# Patient Record
Sex: MALE | Race: WHITE | URBAN - METROPOLITAN AREA
[De-identification: names, ages, dates, MRNs, and addresses within clinical notes are randomized per-mention and may not be internally consistent; named-entity substitution may affect disease eponyms.]

---

## 2019-04-03 ENCOUNTER — OUTPATIENT (OUTPATIENT)
Dept: OUTPATIENT SERVICES | Facility: HOSPITAL | Age: 64
LOS: 1 days | Discharge: HOME | End: 2019-04-03
Payer: COMMERCIAL

## 2019-04-03 DIAGNOSIS — E06.9 THYROIDITIS, UNSPECIFIED: ICD-10-CM

## 2019-04-04 PROCEDURE — 78020 THYROID MET UPTAKE: CPT | Mod: 26

## 2019-04-04 PROCEDURE — 78018 THYROID MET IMAGING BODY: CPT | Mod: 26

## 2019-06-10 ENCOUNTER — OUTPATIENT (OUTPATIENT)
Dept: OUTPATIENT SERVICES | Facility: HOSPITAL | Age: 64
LOS: 1 days | Discharge: HOME | End: 2019-06-10
Payer: COMMERCIAL

## 2019-06-10 DIAGNOSIS — C73 MALIGNANT NEOPLASM OF THYROID GLAND: ICD-10-CM

## 2019-06-10 DIAGNOSIS — D09.3 CARCINOMA IN SITU OF THYROID AND OTHER ENDOCRINE GLANDS: ICD-10-CM

## 2019-06-10 LAB — GLUCOSE BLDC GLUCOMTR-MCNC: 102 MG/DL — HIGH (ref 70–99)

## 2019-06-10 PROCEDURE — 78815 PET IMAGE W/CT SKULL-THIGH: CPT | Mod: 26,PI

## 2021-04-20 PROBLEM — Z00.00 ENCOUNTER FOR PREVENTIVE HEALTH EXAMINATION: Status: ACTIVE | Noted: 2021-04-20

## 2021-07-15 ENCOUNTER — NON-APPOINTMENT (OUTPATIENT)
Age: 66
End: 2021-07-15

## 2021-07-15 ENCOUNTER — TRANSCRIPTION ENCOUNTER (OUTPATIENT)
Age: 66
End: 2021-07-15

## 2021-07-15 ENCOUNTER — APPOINTMENT (OUTPATIENT)
Dept: PULMONOLOGY | Facility: CLINIC | Age: 66
End: 2021-07-15
Payer: MEDICARE

## 2021-07-15 VITALS
DIASTOLIC BLOOD PRESSURE: 78 MMHG | RESPIRATION RATE: 12 BRPM | SYSTOLIC BLOOD PRESSURE: 138 MMHG | HEART RATE: 68 BPM | OXYGEN SATURATION: 94 %

## 2021-07-15 PROCEDURE — 99213 OFFICE O/P EST LOW 20 MIN: CPT

## 2021-07-15 NOTE — PHYSICAL EXAM
[No Acute Distress] : no acute distress [Normal Appearance] : normal appearance [No Neck Mass] : no neck mass [Normal Rate/Rhythm] : normal rate/rhythm [Normal S1, S2] : normal s1, s2 [No Murmurs] : no murmurs [No Resp Distress] : no resp distress [Clear to Auscultation Bilaterally] : clear to auscultation bilaterally [No Abnormalities] : no abnormalities [No Clubbing] : no clubbing [No Cyanosis] : no cyanosis [No Edema] : no edema

## 2021-07-15 NOTE — DISCUSSION/SUMMARY
[FreeTextEntry1] : compliant with cppap and deriving benefit,asthma-controlled///////endocrine f/u//////////needs wt reduction

## 2021-10-19 ENCOUNTER — APPOINTMENT (OUTPATIENT)
Age: 66
End: 2021-10-19
Payer: MEDICARE

## 2021-10-19 VITALS
HEART RATE: 92 BPM | RESPIRATION RATE: 12 BRPM | DIASTOLIC BLOOD PRESSURE: 80 MMHG | SYSTOLIC BLOOD PRESSURE: 134 MMHG | OXYGEN SATURATION: 96 %

## 2021-10-19 DIAGNOSIS — Z85.850 PERSONAL HISTORY OF MALIGNANT NEOPLASM OF THYROID: ICD-10-CM

## 2021-10-19 PROCEDURE — 99213 OFFICE O/P EST LOW 20 MIN: CPT

## 2021-10-19 NOTE — DISCUSSION/SUMMARY
[FreeTextEntry1] : compliant with cppap and deriving benefit,asthma-controlled///////endocrine f/u//////////needs wt reduction//////order f/u ct chest-see report

## 2021-12-15 ENCOUNTER — APPOINTMENT (OUTPATIENT)
Age: 66
End: 2021-12-15
Payer: MEDICARE

## 2021-12-15 VITALS
SYSTOLIC BLOOD PRESSURE: 120 MMHG | RESPIRATION RATE: 14 BRPM | HEART RATE: 64 BPM | WEIGHT: 273 LBS | DIASTOLIC BLOOD PRESSURE: 80 MMHG | OXYGEN SATURATION: 95 %

## 2021-12-15 DIAGNOSIS — R91.1 SOLITARY PULMONARY NODULE: ICD-10-CM

## 2021-12-15 PROCEDURE — 99214 OFFICE O/P EST MOD 30 MIN: CPT

## 2021-12-15 NOTE — HISTORY OF PRESENT ILLNESS
[TextBox_4] : I reviewed my original evaluation  and last notes.\par I reviewed all the previous sleep test that are available on file.\par I reviewed my previous office notes.\par

## 2021-12-15 NOTE — REASON FOR VISIT
[Follow-Up] : a follow-up visit [Asthma] : asthma [Sleep Apnea] : sleep apnea [Pulmonary Nodules] : pulmonary nodules [Obesity] : obesity [TextBox_44] : Patient usually followed by [facility].  Several issues.  First he has stable asthma he is taking his inhalers.  Second he has obstructive sleep apnea.  He is using the machine.  He is morbidly obese.  Finally he has a CAT scan with multiple pulmonary nodules.  There is an 8 mm nodule has not been previously worked up.  The patient has a history of recurrent thyroid carcinoma.  He sees Dr. De: And is in the process of having that worked up

## 2021-12-15 NOTE — ASSESSMENT
[FreeTextEntry1] : Assessment:\par JASE\par Obesity\par \par PLAN:\par The patient is benefitting from the PAP device .\par New supplies ordered \par Weight loss discussed\par I stressed the need maintain compliance  with the PAP device.\par The patient is not to use an Ozone or UV sterilizer. \par F/U in 6 months\par \par Assessment:\par COPD  Asthma:\par \par plan:\par Stress compliance with inhalers. Renewed today.\par cont PRN albuterol\par cont ICS/LABA\par needs PFT\par weight loss\par screening CT chest yearly\par D/C smoking was discussed with the patient\par F/U 6 months\par \par The patient has been using another PAP unit that is not under recall.\par \par I reviewed the CAT scan in detail with the patient and his wife.  We will order a PET scan for ongoing management.  I have given him a prescription.  I will call him once the scan becomes available.

## 2022-01-06 ENCOUNTER — NON-APPOINTMENT (OUTPATIENT)
Age: 67
End: 2022-01-06

## 2022-05-16 ENCOUNTER — APPOINTMENT (OUTPATIENT)
Age: 67
End: 2022-05-16
Payer: MEDICARE

## 2022-05-16 VITALS
RESPIRATION RATE: 14 BRPM | SYSTOLIC BLOOD PRESSURE: 116 MMHG | WEIGHT: 252 LBS | OXYGEN SATURATION: 94 % | DIASTOLIC BLOOD PRESSURE: 72 MMHG | BODY MASS INDEX: 35.28 KG/M2 | HEIGHT: 71 IN | HEART RATE: 69 BPM

## 2022-05-16 DIAGNOSIS — J45.909 UNSPECIFIED ASTHMA, UNCOMPLICATED: ICD-10-CM

## 2022-05-16 PROCEDURE — 99214 OFFICE O/P EST MOD 30 MIN: CPT

## 2022-05-16 RX ORDER — MONTELUKAST 10 MG/1
10 TABLET, FILM COATED ORAL
Qty: 90 | Refills: 3 | Status: DISCONTINUED | COMMUNITY
Start: 2021-07-15 | End: 2022-05-16

## 2022-05-16 RX ORDER — ALBUTEROL SULFATE 90 UG/1
108 (90 BASE) AEROSOL, METERED RESPIRATORY (INHALATION)
Qty: 3 | Refills: 5 | Status: COMPLETED | COMMUNITY
Start: 2021-07-15 | End: 2022-05-16

## 2022-05-16 RX ORDER — BECLOMETHASONE DIPROPIONATE HFA 80 UG/1
80 AEROSOL, METERED RESPIRATORY (INHALATION) TWICE DAILY
Qty: 3 | Refills: 5 | Status: COMPLETED | COMMUNITY
Start: 2021-07-15 | End: 2022-05-16

## 2022-05-16 RX ORDER — MONTELUKAST 10 MG/1
10 TABLET, FILM COATED ORAL
Qty: 90 | Refills: 3 | Status: DISCONTINUED | COMMUNITY
Start: 2021-05-04 | End: 2022-05-16

## 2022-05-16 RX ORDER — MONTELUKAST 10 MG/1
10 TABLET, FILM COATED ORAL DAILY
Qty: 1 | Refills: 5 | Status: COMPLETED | COMMUNITY
Start: 2021-04-21 | End: 2022-05-16

## 2022-05-16 NOTE — REASON FOR VISIT
[Follow-Up] : a follow-up visit [Asthma] : asthma [Sleep Apnea] : sleep apnea [Pulmonary Nodules] : pulmonary nodules [Obesity] : obesity [TextBox_44] : Doing well in general not having any major issues.

## 2022-05-16 NOTE — ASSESSMENT
[FreeTextEntry1] : Assessment:\par JASE\par Obesity\par \par PLAN:\par The patient is benefitting from the PAP device .\par New supplies ordered \par Weight loss discussed\par I stressed the need maintain compliance  with the PAP device.\par The patient is not to use an Ozone or UV sterilizer. \par F/U in 12 months\par \par Assessment:\par Asthma:\par \par plan:\par Stress compliance with inhalers. Renewed today.\par cont PRN albuterol\par cont ICS\par montelukast.\par needs PFT\par weight loss\par repeat CT chest may 2023\par D/C smoking was discussed with the patient\par F/U 6 months\par

## 2022-05-16 NOTE — PHYSICAL EXAM
[No Acute Distress] : no acute distress [Normal Oropharynx] : normal oropharynx [Normal Appearance] : normal appearance [No Neck Mass] : no neck mass [Normal Rate/Rhythm] : normal rate/rhythm [Normal S1, S2] : normal s1, s2 [No Murmurs] : no murmurs [No Resp Distress] : no resp distress [Clear to Auscultation Bilaterally] : clear to auscultation bilaterally [No Abnormalities] : no abnormalities [Benign] : benign [Normal Gait] : normal gait [No Clubbing] : no clubbing [No Cyanosis] : no cyanosis [No Edema] : no edema [FROM] : FROM [Normal Color/ Pigmentation] : normal color/ pigmentation [No Focal Deficits] : no focal deficits [Oriented x3] : oriented x3 [Normal Affect] : normal affect 60-75 yrs

## 2022-05-16 NOTE — HISTORY OF PRESENT ILLNESS
[TextBox_4] : I reviewed and interpreted any  OP CXR or CT available in the files\par I discussed the results today with the patient.\par I partially read the CAT scan results to the patient describing most pertinent issues.  All his questions were answered.\par

## 2022-05-17 RX ORDER — BECLOMETHASONE DIPROPIONATE HFA 80 UG/1
80 AEROSOL, METERED RESPIRATORY (INHALATION) TWICE DAILY
Qty: 3 | Refills: 3 | Status: COMPLETED | COMMUNITY
End: 2022-05-17

## 2022-08-10 ENCOUNTER — APPOINTMENT (OUTPATIENT)
Age: 67
End: 2022-08-10

## 2022-08-10 VITALS
RESPIRATION RATE: 14 BRPM | WEIGHT: 255 LBS | SYSTOLIC BLOOD PRESSURE: 128 MMHG | BODY MASS INDEX: 35.7 KG/M2 | HEART RATE: 76 BPM | HEIGHT: 71 IN | OXYGEN SATURATION: 98 % | DIASTOLIC BLOOD PRESSURE: 78 MMHG

## 2022-08-10 PROCEDURE — 99214 OFFICE O/P EST MOD 30 MIN: CPT

## 2022-08-10 NOTE — REASON FOR VISIT
[Follow-Up] : a follow-up visit [Sleep Apnea] : sleep apnea [Obesity] : obesity [TextBox_44] : Patient with a history of sleep apnea doing well with therapy and

## 2022-08-10 NOTE — ASSESSMENT
[FreeTextEntry1] : Assessment:\par JASE\par Obesity\par \par PLAN:\par The patient is benefitting from the PAP device .\par New supplies ordered \par Weight loss discussed\par I stressed the need maintain compliance  with the PAP device.\par The patient is not to use an Ozone or UV sterilizer. \par F/U in 12 months\par \par

## 2022-11-28 ENCOUNTER — RX RENEWAL (OUTPATIENT)
Age: 67
End: 2022-11-28

## 2023-01-20 ENCOUNTER — RX RENEWAL (OUTPATIENT)
Age: 68
End: 2023-01-20

## 2023-03-13 ENCOUNTER — RX RENEWAL (OUTPATIENT)
Age: 68
End: 2023-03-13

## 2023-05-01 ENCOUNTER — RX RENEWAL (OUTPATIENT)
Age: 68
End: 2023-05-01

## 2023-05-10 ENCOUNTER — NON-APPOINTMENT (OUTPATIENT)
Age: 68
End: 2023-05-10

## 2023-05-15 ENCOUNTER — NON-APPOINTMENT (OUTPATIENT)
Age: 68
End: 2023-05-15

## 2023-05-16 ENCOUNTER — APPOINTMENT (OUTPATIENT)
Dept: PULMONOLOGY | Facility: CLINIC | Age: 68
End: 2023-05-16
Payer: MEDICARE

## 2023-05-16 VITALS
RESPIRATION RATE: 14 BRPM | DIASTOLIC BLOOD PRESSURE: 80 MMHG | OXYGEN SATURATION: 94 % | WEIGHT: 259 LBS | HEART RATE: 62 BPM | SYSTOLIC BLOOD PRESSURE: 126 MMHG | HEIGHT: 71 IN | BODY MASS INDEX: 36.26 KG/M2

## 2023-05-16 PROCEDURE — 99214 OFFICE O/P EST MOD 30 MIN: CPT

## 2023-05-16 NOTE — REASON FOR VISIT
[Follow-Up] : a follow-up visit [Pulmonary Nodules] : pulmonary nodules [TextBox_44] : We reviewed the patient's recent CAT scan which shows an increased number and size of some of the pulmonary nodules.  He has a history of thyroid cancer.  He has had multiple ultrasounds which did not show any change within the thyroid.  He feels fine otherwise.

## 2023-05-16 NOTE — ASSESSMENT
[FreeTextEntry1] : We are going to present his case at the thoracic oncology conference\par I will arrange for a PET CAT scan.\par We will speak after results of the PET scan become available and day after the conference.\par He will likely need biopsy.  We will try to choose the best source.  An open procedure may be needed given the small size of these nodules\par \par Assessment:\par JASE\par Obesity\par \par PLAN:\par The patient is benefitting from the PAP device .\par New supplies ordered \par Weight loss discussed\par I stressed the need maintain compliance  with the PAP device.\par The patient is not to use an Ozone or UV sterilizer. \par \par \par \par \par I reviewed the entire case with the patient's wife who was in attendance today.\par \par

## 2023-06-01 ENCOUNTER — NON-APPOINTMENT (OUTPATIENT)
Age: 68
End: 2023-06-01

## 2023-06-02 ENCOUNTER — APPOINTMENT (OUTPATIENT)
Dept: PULMONOLOGY | Facility: CLINIC | Age: 68
End: 2023-06-02
Payer: MEDICARE

## 2023-06-02 VITALS
DIASTOLIC BLOOD PRESSURE: 70 MMHG | WEIGHT: 260 LBS | OXYGEN SATURATION: 96 % | BODY MASS INDEX: 35.21 KG/M2 | SYSTOLIC BLOOD PRESSURE: 130 MMHG | HEIGHT: 72 IN | HEART RATE: 69 BPM

## 2023-06-02 PROCEDURE — 99213 OFFICE O/P EST LOW 20 MIN: CPT

## 2023-06-02 NOTE — PHYSICAL EXAM
[No Acute Distress] : no acute distress [Normal Appearance] : normal appearance [Normal Oropharynx] : normal oropharynx [No Neck Mass] : no neck mass [Normal Rate/Rhythm] : normal rate/rhythm [Normal S1, S2] : normal s1, s2 [No Murmurs] : no murmurs [No Resp Distress] : no resp distress [Clear to Auscultation Bilaterally] : clear to auscultation bilaterally [Benign] : benign [No Abnormalities] : no abnormalities [Normal Gait] : normal gait [No Clubbing] : no clubbing [No Cyanosis] : no cyanosis [FROM] : FROM [No Edema] : no edema [Normal Color/ Pigmentation] : normal color/ pigmentation [No Focal Deficits] : no focal deficits [Normal Affect] : normal affect [Oriented x3] : oriented x3

## 2023-06-02 NOTE — REASON FOR VISIT
[Follow-Up] : a follow-up visit [Sleep Apnea] : sleep apnea [Obesity] : obesity [TextBox_44] : Patient has a history of obstructive sleep apnea on CPAP is doing very well from that standpoint.\par \par The patient recently had a PET scan for evaluation of multiple pulmonary nodules.  The nodules were PET negative.  However the seem to be more nodules in number as well as some increasing in size.  I spoke with Dr. De who believes that we may be dealing with metastatic thyroid cancer given his thyroglobulin levels.  He is going to order an I-131 test.  If they do not light up he will need therapy along these lines.  If not we will need to make arrangements for CT-guided biopsy.  I will present him at thoracic conference.

## 2023-06-02 NOTE — PHYSICAL EXAM
[No Acute Distress] : no acute distress [Normal Oropharynx] : normal oropharynx [Normal Appearance] : normal appearance [No Neck Mass] : no neck mass [Normal Rate/Rhythm] : normal rate/rhythm [Normal S1, S2] : normal s1, s2 [No Murmurs] : no murmurs [No Resp Distress] : no resp distress [Clear to Auscultation Bilaterally] : clear to auscultation bilaterally [Benign] : benign [No Abnormalities] : no abnormalities [Normal Gait] : normal gait [No Clubbing] : no clubbing [No Cyanosis] : no cyanosis [FROM] : FROM [No Edema] : no edema [No Focal Deficits] : no focal deficits [Normal Color/ Pigmentation] : normal color/ pigmentation [Oriented x3] : oriented x3 [Normal Affect] : normal affect

## 2023-06-02 NOTE — ASSESSMENT
[FreeTextEntry1] : We are going to present his case at the thoracic oncology conference again for evaluation of possible biopsy\par Dr. Winters is going to arrange the I-131 test\par \par JASE\par Obesity\par \par PLAN:\par The patient is benefitting from the PAP device .\par New supplies ordered \par Weight loss discussed\par I stressed the need maintain compliance  with the PAP device.\par The patient is not to use an Ozone or UV sterilizer. \par \par \par \par \par I reviewed the entire case with the patient's wife who was in attendance today.\par \par

## 2023-06-19 ENCOUNTER — RX RENEWAL (OUTPATIENT)
Age: 68
End: 2023-06-19

## 2023-07-12 ENCOUNTER — OUTPATIENT (OUTPATIENT)
Dept: OUTPATIENT SERVICES | Facility: HOSPITAL | Age: 68
LOS: 1 days | End: 2023-07-12
Payer: MEDICARE

## 2023-07-12 DIAGNOSIS — C73 MALIGNANT NEOPLASM OF THYROID GLAND: ICD-10-CM

## 2023-07-12 PROCEDURE — A9528: CPT | Mod: JG

## 2023-07-13 ENCOUNTER — OUTPATIENT (OUTPATIENT)
Dept: OUTPATIENT SERVICES | Facility: HOSPITAL | Age: 68
LOS: 1 days | End: 2023-07-13
Payer: MEDICARE

## 2023-07-13 DIAGNOSIS — C73 MALIGNANT NEOPLASM OF THYROID GLAND: ICD-10-CM

## 2023-07-13 PROCEDURE — 78018 THYROID MET IMAGING BODY: CPT | Mod: 26,59,MH

## 2023-07-13 PROCEDURE — 79005 NUCLEAR RX ORAL ADMIN: CPT | Mod: 26

## 2023-07-13 PROCEDURE — 78020 THYROID MET UPTAKE: CPT | Mod: 26,MH

## 2023-07-20 ENCOUNTER — OUTPATIENT (OUTPATIENT)
Dept: OUTPATIENT SERVICES | Facility: HOSPITAL | Age: 68
LOS: 1 days | End: 2023-07-20
Payer: MEDICARE

## 2023-07-20 DIAGNOSIS — C73 MALIGNANT NEOPLASM OF THYROID GLAND: ICD-10-CM

## 2023-07-20 PROCEDURE — 78018 THYROID MET IMAGING BODY: CPT | Mod: 26,MH

## 2023-07-25 ENCOUNTER — NON-APPOINTMENT (OUTPATIENT)
Age: 68
End: 2023-07-25

## 2023-08-03 ENCOUNTER — APPOINTMENT (OUTPATIENT)
Dept: OTOLARYNGOLOGY | Facility: CLINIC | Age: 68
End: 2023-08-03
Payer: MEDICARE

## 2023-08-03 ENCOUNTER — RX RENEWAL (OUTPATIENT)
Age: 68
End: 2023-08-03

## 2023-08-03 VITALS — BODY MASS INDEX: 35.21 KG/M2 | HEIGHT: 72 IN | WEIGHT: 260 LBS

## 2023-08-03 DIAGNOSIS — R13.10 DYSPHAGIA, UNSPECIFIED: ICD-10-CM

## 2023-08-03 PROCEDURE — 99204 OFFICE O/P NEW MOD 45 MIN: CPT | Mod: 25

## 2023-08-03 PROCEDURE — 31575 DIAGNOSTIC LARYNGOSCOPY: CPT

## 2023-08-03 NOTE — PROCEDURE
[Hoarseness] : hoarseness not clearly evaluated by indirect laryngoscopy [None] : none [Normal] : posterior cricoid area had healthy pink mucosa in the interarytenoid area and the esophageal inlet

## 2023-08-03 NOTE — HISTORY OF PRESENT ILLNESS
[de-identified] : Patient presents today c/o hoarseness . Had procedure performed  thyroid infections on 7/11/23 and  7/12/23  hoarseness  began. Has history of Thyroid cancer. After having 2 doses of Thyrogen on 7/11/23 he lost his voice the next day.  He has  noticed a change in swallowing , has a sensation of  food get  suck. Will loose breath after speaking for a period of time. Denies mucus production, sore throat, or pain. Pt has known thyroid cancer with mets to lung.

## 2023-08-03 NOTE — ASSESSMENT
[FreeTextEntry1] : We spoke about risk benefit and alternatives of medialization that included but were not limited to bleeding, infection, recurrence, need for additional surgery, change in voice, etc...

## 2023-08-10 ENCOUNTER — APPOINTMENT (OUTPATIENT)
Dept: OTOLARYNGOLOGY | Facility: CLINIC | Age: 68
End: 2023-08-10
Payer: MEDICARE

## 2023-08-10 PROCEDURE — 92524 BEHAVRAL QUALIT ANALYS VOICE: CPT | Mod: GN,59

## 2023-08-10 PROCEDURE — 92507 TX SP LANG VOICE COMM INDIV: CPT

## 2023-08-10 PROCEDURE — 92507 TX SP LANG VOICE COMM INDIV: CPT | Mod: GN

## 2023-08-11 DIAGNOSIS — F41.9 ANXIETY DISORDER, UNSPECIFIED: ICD-10-CM

## 2023-08-15 ENCOUNTER — APPOINTMENT (OUTPATIENT)
Dept: OTOLARYNGOLOGY | Facility: CLINIC | Age: 68
End: 2023-08-15
Payer: MEDICARE

## 2023-08-15 PROCEDURE — 31591 LARYNGOPLASTY MEDIALIZATION: CPT | Mod: RT

## 2023-08-18 ENCOUNTER — APPOINTMENT (OUTPATIENT)
Dept: OTOLARYNGOLOGY | Facility: CLINIC | Age: 68
End: 2023-08-18
Payer: MEDICARE

## 2023-08-18 PROCEDURE — 92507 TX SP LANG VOICE COMM INDIV: CPT | Mod: GN

## 2023-08-24 ENCOUNTER — APPOINTMENT (OUTPATIENT)
Dept: OTOLARYNGOLOGY | Facility: CLINIC | Age: 68
End: 2023-08-24
Payer: MEDICARE

## 2023-08-24 PROCEDURE — 92507 TX SP LANG VOICE COMM INDIV: CPT | Mod: GN

## 2023-08-24 PROCEDURE — 99214 OFFICE O/P EST MOD 30 MIN: CPT | Mod: 25

## 2023-08-24 PROCEDURE — 31575 DIAGNOSTIC LARYNGOSCOPY: CPT | Mod: 58

## 2023-08-24 RX ORDER — ALPRAZOLAM 0.5 MG/1
0.5 TABLET ORAL
Qty: 8 | Refills: 0 | Status: ACTIVE | COMMUNITY
Start: 2023-08-11 | End: 1900-01-01

## 2023-08-24 RX ORDER — ALPRAZOLAM 0.5 MG/1
0.5 TABLET ORAL
Qty: 8 | Refills: 0 | Status: ACTIVE | COMMUNITY
Start: 2023-08-24 | End: 1900-01-01

## 2023-08-24 NOTE — DATA REVIEWED
[de-identified] : CT Chest with IV Contrast: Impression: Numerous B/L tiny to small pulmonary modules ranging from 2-9mm in diameter scattered throughout lungs, almost all unchanged as compared to May 11, 2023 except for a single right base nodule which is increased in size as described above.  New areas of presumes postoperative discoid atelectasis and postop scarring, in the anterior right upper lobe extending posteriorly to the hilum and in the anterolateral left upper lobe as described above. No mediastinal adenopathy or pleural effusions. Moderate coronary artery calcifications again noted.   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 No focal mass lesions are seen along the course of the recurrent laryngeal nerve. Status post thyroidectomy with no visualized thyroid tissue. No evidence of pathologic lymphadenopathy by imaging size criteria.

## 2023-08-24 NOTE — HISTORY OF PRESENT ILLNESS
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  Patient had a CT done 08/16/2023 and is here to review the results.  His voice is still very hoarse and it comes and goes, worsens throughout the day. Working with speech pathology.

## 2023-08-24 NOTE — PROCEDURE
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

## 2023-08-24 NOTE — REASON FOR VISIT
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

## 2023-08-29 ENCOUNTER — APPOINTMENT (OUTPATIENT)
Dept: OTOLARYNGOLOGY | Facility: CLINIC | Age: 68
End: 2023-08-29
Payer: MEDICARE

## 2023-08-29 PROCEDURE — 92507 TX SP LANG VOICE COMM INDIV: CPT | Mod: GN

## 2023-09-01 ENCOUNTER — APPOINTMENT (OUTPATIENT)
Dept: CARDIOLOGY | Facility: CLINIC | Age: 68
End: 2023-09-01
Payer: MEDICARE

## 2023-09-01 VITALS
WEIGHT: 265 LBS | SYSTOLIC BLOOD PRESSURE: 122 MMHG | HEIGHT: 72 IN | BODY MASS INDEX: 35.89 KG/M2 | DIASTOLIC BLOOD PRESSURE: 80 MMHG

## 2023-09-01 DIAGNOSIS — Z78.9 OTHER SPECIFIED HEALTH STATUS: ICD-10-CM

## 2023-09-01 DIAGNOSIS — I10 ESSENTIAL (PRIMARY) HYPERTENSION: ICD-10-CM

## 2023-09-01 DIAGNOSIS — Z82.3 FAMILY HISTORY OF STROKE: ICD-10-CM

## 2023-09-01 DIAGNOSIS — C79.89 SECONDARY MALIGNANT NEOPLASM OF OTHER SPECIFIED SITES: ICD-10-CM

## 2023-09-01 DIAGNOSIS — I45.10 UNSPECIFIED RIGHT BUNDLE-BRANCH BLOCK: ICD-10-CM

## 2023-09-01 DIAGNOSIS — C80.1 SECONDARY MALIGNANT NEOPLASM OF OTHER SPECIFIED SITES: ICD-10-CM

## 2023-09-01 PROCEDURE — 93000 ELECTROCARDIOGRAM COMPLETE: CPT

## 2023-09-01 PROCEDURE — 99204 OFFICE O/P NEW MOD 45 MIN: CPT

## 2023-09-01 RX ORDER — METOPROLOL SUCCINATE 50 MG/1
50 TABLET, EXTENDED RELEASE ORAL
Refills: 0 | Status: ACTIVE | COMMUNITY
Start: 2023-09-01

## 2023-09-01 RX ORDER — LISINOPRIL 10 MG/1
10 TABLET ORAL TWICE DAILY
Refills: 0 | Status: ACTIVE | COMMUNITY
Start: 2023-09-01

## 2023-09-01 RX ORDER — HYDROCHLOROTHIAZIDE 12.5 MG/1
12.5 CAPSULE ORAL
Refills: 0 | Status: ACTIVE | COMMUNITY
Start: 2023-09-01

## 2023-09-01 RX ORDER — LEVOTHYROXINE SODIUM 150 UG/1
150 TABLET ORAL
Refills: 0 | Status: ACTIVE | COMMUNITY
Start: 2023-09-01

## 2023-09-01 NOTE — PHYSICAL EXAM
[Well Developed] : well developed [Well Nourished] : well nourished [Normal Conjunctiva] : normal conjunctiva [Normal Venous Pressure] : normal venous pressure [No Carotid Bruit] : no carotid bruit [Normal S1, S2] : normal S1, S2 [No Murmur] : no murmur [Clear Lung Fields] : clear lung fields [Soft] : abdomen soft [Non Tender] : non-tender [Normal Gait] : normal gait [No Edema] : no edema [No Rash] : no rash [Moves all extremities] : moves all extremities [Alert and Oriented] : alert and oriented

## 2023-09-01 NOTE — ASSESSMENT
[FreeTextEntry1] : 67 HTN HLD Thyroid cancer. JASE on c-pap. 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 Getting better. He had ct scan chest coronary calcification. Goes gym 3 /wk bicycle 30 mins and then wts.  He has no chest pain or sob. No palpitations. He needs ldl to be about 70 or less . Patient and wife aware. Statin increased. Repeat lipids about 3 mos. Because abnormal ekg and calcium heart. He needs a stress echo. He needs weight loss. Patient agrees. Aware need wt loss. Blood to be done by PMD.

## 2023-09-01 NOTE — REVIEW OF SYSTEMS
[Fever] : no fever [Chills] : no chills [Blurry Vision] : no blurred vision [Hearing Loss] : no hearing loss [Dyspnea on exertion] : not dyspnea during exertion [Wheezing] : no wheezing [Abdominal Pain] : no abdominal pain [Nocturia] : no nocturia [Joint Pain] : joint pain [Muscle Cramps] : muscle cramps [Knee Pain] : knee pain [Rash] : no rash [Dizziness] : no dizziness [Confusion] : no confusion was observed [Easy Bleeding] : no tendency for easy bleeding

## 2023-09-01 NOTE — HISTORY OF PRESENT ILLNESS
[FreeTextEntry1] : 67 HTN HLD Thyroid cancer. JASE on c-pap. 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 Getting better. He had ct scan chest coronary calcification. Goes gym 3 /wk bicycle 30 mins and then wts.  He has no chest pain or sob. No palpitations. He needs wt loss. He has abnormal EKG. He needs weight loss. Reviewed patient family.

## 2023-09-07 ENCOUNTER — APPOINTMENT (OUTPATIENT)
Dept: OTOLARYNGOLOGY | Facility: CLINIC | Age: 68
End: 2023-09-07
Payer: MEDICARE

## 2023-09-07 PROCEDURE — 31575 DIAGNOSTIC LARYNGOSCOPY: CPT | Mod: 58

## 2023-09-07 PROCEDURE — 99213 OFFICE O/P EST LOW 20 MIN: CPT | Mod: 25

## 2023-09-07 NOTE — DATA REVIEWED
[de-identified] : Test was reviewed  and interpreted by me. See official report below. CT 8/16/23- multiple pulmonary nodules, postoperative scarring, no masses along RLN and vocal cord paresis

## 2023-09-12 ENCOUNTER — APPOINTMENT (OUTPATIENT)
Dept: OTOLARYNGOLOGY | Facility: CLINIC | Age: 68
End: 2023-09-12
Payer: MEDICARE

## 2023-09-12 PROCEDURE — 92507 TX SP LANG VOICE COMM INDIV: CPT | Mod: GN

## 2023-09-19 ENCOUNTER — APPOINTMENT (OUTPATIENT)
Dept: OTOLARYNGOLOGY | Facility: CLINIC | Age: 68
End: 2023-09-19
Payer: MEDICARE

## 2023-09-19 PROCEDURE — 92507 TX SP LANG VOICE COMM INDIV: CPT | Mod: GN

## 2023-09-27 ENCOUNTER — APPOINTMENT (OUTPATIENT)
Dept: OTOLARYNGOLOGY | Facility: CLINIC | Age: 68
End: 2023-09-27
Payer: MEDICARE

## 2023-09-27 PROCEDURE — 92507 TX SP LANG VOICE COMM INDIV: CPT | Mod: GN

## 2023-10-04 ENCOUNTER — APPOINTMENT (OUTPATIENT)
Dept: OTOLARYNGOLOGY | Facility: CLINIC | Age: 68
End: 2023-10-04
Payer: MEDICARE

## 2023-10-04 PROCEDURE — 92507 TX SP LANG VOICE COMM INDIV: CPT | Mod: GN

## 2023-10-11 ENCOUNTER — APPOINTMENT (OUTPATIENT)
Dept: OTOLARYNGOLOGY | Facility: CLINIC | Age: 68
End: 2023-10-11
Payer: MEDICARE

## 2023-10-11 PROCEDURE — 99024 POSTOP FOLLOW-UP VISIT: CPT | Mod: 25

## 2023-10-11 PROCEDURE — 31575 DIAGNOSTIC LARYNGOSCOPY: CPT | Mod: 79

## 2023-10-11 PROCEDURE — 99214 OFFICE O/P EST MOD 30 MIN: CPT | Mod: 25

## 2023-10-11 PROCEDURE — 92507 TX SP LANG VOICE COMM INDIV: CPT | Mod: GN,59

## 2023-10-11 RX ORDER — PANTOPRAZOLE 40 MG/1
40 TABLET, DELAYED RELEASE ORAL
Qty: 90 | Refills: 3 | Status: ACTIVE | COMMUNITY
Start: 2023-08-03 | End: 1900-01-01

## 2023-10-11 RX ORDER — LEVOFLOXACIN 500 MG/1
500 TABLET, FILM COATED ORAL DAILY
Qty: 7 | Refills: 0 | Status: ACTIVE | COMMUNITY
Start: 2023-10-11 | End: 1900-01-01

## 2023-10-25 ENCOUNTER — APPOINTMENT (OUTPATIENT)
Dept: OTOLARYNGOLOGY | Facility: CLINIC | Age: 68
End: 2023-10-25
Payer: MEDICARE

## 2023-10-25 PROCEDURE — 92507 TX SP LANG VOICE COMM INDIV: CPT | Mod: GN

## 2023-10-31 ENCOUNTER — OUTPATIENT (OUTPATIENT)
Dept: OUTPATIENT SERVICES | Facility: HOSPITAL | Age: 68
LOS: 1 days | End: 2023-10-31
Payer: MEDICARE

## 2023-10-31 ENCOUNTER — APPOINTMENT (OUTPATIENT)
Dept: CV DIAGNOSTICS | Facility: HOSPITAL | Age: 68
End: 2023-10-31
Payer: MEDICARE

## 2023-10-31 DIAGNOSIS — I10 ESSENTIAL (PRIMARY) HYPERTENSION: ICD-10-CM

## 2023-10-31 PROCEDURE — 93320 DOPPLER ECHO COMPLETE: CPT

## 2023-10-31 PROCEDURE — 93351 STRESS TTE COMPLETE: CPT | Mod: 26

## 2023-10-31 PROCEDURE — 93325 DOPPLER ECHO COLOR FLOW MAPG: CPT

## 2023-10-31 PROCEDURE — 93351 STRESS TTE COMPLETE: CPT

## 2023-11-01 ENCOUNTER — APPOINTMENT (OUTPATIENT)
Dept: OTOLARYNGOLOGY | Facility: CLINIC | Age: 68
End: 2023-11-01
Payer: MEDICARE

## 2023-11-01 DIAGNOSIS — I10 ESSENTIAL (PRIMARY) HYPERTENSION: ICD-10-CM

## 2023-11-01 PROCEDURE — 92507 TX SP LANG VOICE COMM INDIV: CPT | Mod: GN

## 2023-12-07 ENCOUNTER — APPOINTMENT (OUTPATIENT)
Dept: OTOLARYNGOLOGY | Facility: CLINIC | Age: 68
End: 2023-12-07
Payer: MEDICARE

## 2023-12-07 PROCEDURE — 99213 OFFICE O/P EST LOW 20 MIN: CPT | Mod: 25

## 2023-12-07 PROCEDURE — 31575 DIAGNOSTIC LARYNGOSCOPY: CPT

## 2024-01-08 ENCOUNTER — RX RENEWAL (OUTPATIENT)
Age: 69
End: 2024-01-08

## 2024-01-08 RX ORDER — FAMOTIDINE 40 MG/1
40 TABLET, FILM COATED ORAL
Qty: 90 | Refills: 0 | Status: ACTIVE | COMMUNITY
Start: 2023-08-03 | End: 1900-01-01

## 2024-02-05 ENCOUNTER — APPOINTMENT (OUTPATIENT)
Dept: PULMONOLOGY | Facility: CLINIC | Age: 69
End: 2024-02-05
Payer: MEDICARE

## 2024-02-05 VITALS
HEIGHT: 72 IN | BODY MASS INDEX: 35.89 KG/M2 | DIASTOLIC BLOOD PRESSURE: 82 MMHG | OXYGEN SATURATION: 96 % | HEART RATE: 93 BPM | WEIGHT: 265 LBS | SYSTOLIC BLOOD PRESSURE: 110 MMHG

## 2024-02-05 DIAGNOSIS — E66.9 OBESITY, UNSPECIFIED: ICD-10-CM

## 2024-02-05 DIAGNOSIS — R91.1 SOLITARY PULMONARY NODULE: ICD-10-CM

## 2024-02-05 DIAGNOSIS — G47.33 OBSTRUCTIVE SLEEP APNEA (ADULT) (PEDIATRIC): ICD-10-CM

## 2024-02-05 PROCEDURE — 99214 OFFICE O/P EST MOD 30 MIN: CPT

## 2024-02-05 RX ORDER — FLUTICASONE FUROATE 200 UG/1
200 POWDER RESPIRATORY (INHALATION) DAILY
Qty: 3 | Refills: 3 | Status: ACTIVE | COMMUNITY
Start: 1900-01-01 | End: 1900-01-01

## 2024-02-05 RX ORDER — SIMVASTATIN 80 MG/1
80 TABLET, FILM COATED ORAL
Refills: 0 | Status: ACTIVE | COMMUNITY

## 2024-02-05 RX ORDER — ALBUTEROL SULFATE 90 UG/1
108 (90 BASE) INHALANT RESPIRATORY (INHALATION)
Qty: 3 | Refills: 3 | Status: ACTIVE | COMMUNITY
Start: 2022-11-28 | End: 1900-01-01

## 2024-02-05 RX ORDER — MONTELUKAST 10 MG/1
10 TABLET, FILM COATED ORAL
Qty: 90 | Refills: 3 | Status: ACTIVE | COMMUNITY
Start: 2023-06-19 | End: 1900-01-01

## 2024-02-05 RX ORDER — SIMVASTATIN 40 MG/1
40 TABLET, FILM COATED ORAL
Refills: 0 | Status: COMPLETED | COMMUNITY
Start: 2023-09-01 | End: 2024-02-05

## 2024-02-05 NOTE — ASSESSMENT
[FreeTextEntry1] :  Asthma JASE Obesity  PLAN: The patient is benefitting from the PAP device . New supplies ordered  Weight loss discussed I stressed the need maintain compliance  with the PAP device. The patient is not to use an Ozone or UV sterilizer.   Assessment: Asthma: Multiple pulmonary nodules No sign of depressive symptoms plan: Stress compliance with inhalers. Renewed today. cont PRN albuterol cont ICS Singulair needs PFT weight loss CT chest will be considered in a year  F/U 6 months

## 2024-02-05 NOTE — REASON FOR VISIT
[Follow-Up] : a follow-up visit [Pulmonary Nodules] : pulmonary nodules [TextBox_44] : Follow-up for multiple pulmonary nodules.  He had a PET scan performed after a M spike.  The PET scan was negative.  All the nodules failed to light up and there were no abnormalities in his bony structures.  Also the nodules were either stable or smaller than previous there is no progression.

## 2024-03-07 ENCOUNTER — APPOINTMENT (OUTPATIENT)
Dept: OTOLARYNGOLOGY | Facility: CLINIC | Age: 69
End: 2024-03-07
Payer: MEDICARE

## 2024-03-07 DIAGNOSIS — R49.0 DYSPHONIA: ICD-10-CM

## 2024-03-07 DIAGNOSIS — J02.9 ACUTE PHARYNGITIS, UNSPECIFIED: ICD-10-CM

## 2024-03-07 PROCEDURE — 99213 OFFICE O/P EST LOW 20 MIN: CPT | Mod: 25

## 2024-03-07 PROCEDURE — 31575 DIAGNOSTIC LARYNGOSCOPY: CPT

## 2024-03-07 NOTE — PHYSICAL EXAM
[Nasal Endoscopy Performed] : nasal endoscopy was performed, see procedure section for findings [Normal] : palatine tonsils are normal [Midline] : trachea located in midline position

## 2024-03-07 NOTE — HISTORY OF PRESENT ILLNESS
[FreeTextEntry1] : Patient returns today c/o hoarseness.  He has noticed improvement with voice since last visit, mostly back to normal. Continues to take famotidine and pantoprazole. Has not noticed any change in voice. Occasionally will have some phlegm throughout the day. No sore throat.

## 2024-03-07 NOTE — PROCEDURE
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

## 2024-03-08 ENCOUNTER — APPOINTMENT (OUTPATIENT)
Dept: CARDIOLOGY | Facility: CLINIC | Age: 69
End: 2024-03-08
Payer: MEDICARE

## 2024-03-08 VITALS
DIASTOLIC BLOOD PRESSURE: 80 MMHG | SYSTOLIC BLOOD PRESSURE: 126 MMHG | WEIGHT: 265 LBS | HEIGHT: 72 IN | HEART RATE: 56 BPM | BODY MASS INDEX: 35.89 KG/M2 | OXYGEN SATURATION: 97 %

## 2024-03-08 DIAGNOSIS — R06.02 SHORTNESS OF BREATH: ICD-10-CM

## 2024-03-08 DIAGNOSIS — J38.00 PARALYSIS OF VOCAL CORDS AND LARYNX, UNSPECIFIED: ICD-10-CM

## 2024-03-08 DIAGNOSIS — G47.33 OBSTRUCTIVE SLEEP APNEA (ADULT) (PEDIATRIC): ICD-10-CM

## 2024-03-08 DIAGNOSIS — I25.84 ATHEROSCLEROTIC HEART DISEASE OF NATIVE CORONARY ARTERY W/OUT ANGINA PECTORIS: ICD-10-CM

## 2024-03-08 DIAGNOSIS — C73 MALIGNANT NEOPLASM OF THYROID GLAND: ICD-10-CM

## 2024-03-08 DIAGNOSIS — E78.5 HYPERLIPIDEMIA, UNSPECIFIED: ICD-10-CM

## 2024-03-08 DIAGNOSIS — I25.10 ATHEROSCLEROTIC HEART DISEASE OF NATIVE CORONARY ARTERY W/OUT ANGINA PECTORIS: ICD-10-CM

## 2024-03-08 PROCEDURE — 99214 OFFICE O/P EST MOD 30 MIN: CPT

## 2024-03-08 RX ORDER — EZETIMIBE 10 MG/1
10 TABLET ORAL
Qty: 90 | Refills: 3 | Status: ACTIVE | COMMUNITY
Start: 2024-03-08 | End: 1900-01-01

## 2024-03-08 NOTE — REVIEW OF SYSTEMS
[Joint Pain] : joint pain [Muscle Cramps] : muscle cramps [Knee Pain] : knee pain [Fever] : no fever [Chills] : no chills [Blurry Vision] : no blurred vision [Hearing Loss] : no hearing loss [Dyspnea on exertion] : not dyspnea during exertion [Wheezing] : no wheezing [Nocturia] : no nocturia [Abdominal Pain] : no abdominal pain [Rash] : no rash [Dizziness] : no dizziness [Confusion] : no confusion was observed [Easy Bleeding] : no tendency for easy bleeding

## 2024-03-08 NOTE — HISTORY OF PRESENT ILLNESS
[FreeTextEntry1] : 68 HTN HLD Thyroid cancer. JASE on c-pap. 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 Getting better. He had ct scan chest coronary calcification. Goes gym 3 /wk bicycle 30 mins and treadmill 30 minutes.  and then wts.  He has no chest pain or sob. No palpitations. He needs wt loss. He has abnormal EKG. He needs weight loss.

## 2024-06-12 ENCOUNTER — NON-APPOINTMENT (OUTPATIENT)
Age: 69
End: 2024-06-12

## 2024-06-12 ENCOUNTER — INPATIENT (INPATIENT)
Facility: HOSPITAL | Age: 69
LOS: 1 days | Discharge: HOME | DRG: 872 | End: 2024-06-14
Attending: INTERNAL MEDICINE | Admitting: INTERNAL MEDICINE
Payer: MEDICARE

## 2024-06-12 VITALS
HEART RATE: 94 BPM | RESPIRATION RATE: 18 BRPM | HEIGHT: 67 IN | SYSTOLIC BLOOD PRESSURE: 128 MMHG | WEIGHT: 220.02 LBS | DIASTOLIC BLOOD PRESSURE: 89 MMHG | TEMPERATURE: 101 F | OXYGEN SATURATION: 97 %

## 2024-06-12 DIAGNOSIS — L03.90 CELLULITIS, UNSPECIFIED: ICD-10-CM

## 2024-06-12 DIAGNOSIS — E89.0 POSTPROCEDURAL HYPOTHYROIDISM: Chronic | ICD-10-CM

## 2024-06-12 LAB
ALBUMIN SERPL ELPH-MCNC: 4.4 G/DL — SIGNIFICANT CHANGE UP (ref 3.5–5.2)
ALP SERPL-CCNC: 52 U/L — SIGNIFICANT CHANGE UP (ref 30–115)
ALT FLD-CCNC: 15 U/L — SIGNIFICANT CHANGE UP (ref 0–41)
ANION GAP SERPL CALC-SCNC: 8 MMOL/L — SIGNIFICANT CHANGE UP (ref 7–14)
AST SERPL-CCNC: 21 U/L — SIGNIFICANT CHANGE UP (ref 0–41)
BASOPHILS # BLD AUTO: 0.01 K/UL — SIGNIFICANT CHANGE UP (ref 0–0.2)
BASOPHILS NFR BLD AUTO: 0.1 % — SIGNIFICANT CHANGE UP (ref 0–1)
BILIRUB SERPL-MCNC: 0.9 MG/DL — SIGNIFICANT CHANGE UP (ref 0.2–1.2)
BUN SERPL-MCNC: 16 MG/DL — SIGNIFICANT CHANGE UP (ref 10–20)
CALCIUM SERPL-MCNC: 10.4 MG/DL — SIGNIFICANT CHANGE UP (ref 8.4–10.5)
CHLORIDE SERPL-SCNC: 94 MMOL/L — LOW (ref 98–110)
CO2 SERPL-SCNC: 26 MMOL/L — SIGNIFICANT CHANGE UP (ref 17–32)
CREAT SERPL-MCNC: 1.2 MG/DL — SIGNIFICANT CHANGE UP (ref 0.7–1.5)
EGFR: 66 ML/MIN/1.73M2 — SIGNIFICANT CHANGE UP
EOSINOPHIL # BLD AUTO: 0.01 K/UL — SIGNIFICANT CHANGE UP (ref 0–0.7)
EOSINOPHIL NFR BLD AUTO: 0.1 % — SIGNIFICANT CHANGE UP (ref 0–8)
GLUCOSE SERPL-MCNC: 97 MG/DL — SIGNIFICANT CHANGE UP (ref 70–99)
HCT VFR BLD CALC: 38.5 % — LOW (ref 42–52)
HGB BLD-MCNC: 13.2 G/DL — LOW (ref 14–18)
IMM GRANULOCYTES NFR BLD AUTO: 0.3 % — SIGNIFICANT CHANGE UP (ref 0.1–0.3)
LACTATE SERPL-SCNC: 1.5 MMOL/L — SIGNIFICANT CHANGE UP (ref 0.7–2)
LYMPHOCYTES # BLD AUTO: 0.52 K/UL — LOW (ref 1.2–3.4)
LYMPHOCYTES # BLD AUTO: 5.8 % — LOW (ref 20.5–51.1)
MCHC RBC-ENTMCNC: 32.6 PG — HIGH (ref 27–31)
MCHC RBC-ENTMCNC: 34.3 G/DL — SIGNIFICANT CHANGE UP (ref 32–37)
MCV RBC AUTO: 95.1 FL — HIGH (ref 80–94)
MONOCYTES # BLD AUTO: 0.5 K/UL — SIGNIFICANT CHANGE UP (ref 0.1–0.6)
MONOCYTES NFR BLD AUTO: 5.6 % — SIGNIFICANT CHANGE UP (ref 1.7–9.3)
NEUTROPHILS # BLD AUTO: 7.87 K/UL — HIGH (ref 1.4–6.5)
NEUTROPHILS NFR BLD AUTO: 88.1 % — HIGH (ref 42.2–75.2)
NRBC # BLD: 0 /100 WBCS — SIGNIFICANT CHANGE UP (ref 0–0)
PLATELET # BLD AUTO: 142 K/UL — SIGNIFICANT CHANGE UP (ref 130–400)
PMV BLD: 9.8 FL — SIGNIFICANT CHANGE UP (ref 7.4–10.4)
POTASSIUM SERPL-MCNC: 4 MMOL/L — SIGNIFICANT CHANGE UP (ref 3.5–5)
POTASSIUM SERPL-SCNC: 4 MMOL/L — SIGNIFICANT CHANGE UP (ref 3.5–5)
PROT SERPL-MCNC: 7.9 G/DL — SIGNIFICANT CHANGE UP (ref 6–8)
RBC # BLD: 4.05 M/UL — LOW (ref 4.7–6.1)
RBC # FLD: 13.5 % — SIGNIFICANT CHANGE UP (ref 11.5–14.5)
SODIUM SERPL-SCNC: 128 MMOL/L — LOW (ref 135–146)
WBC # BLD: 8.94 K/UL — SIGNIFICANT CHANGE UP (ref 4.8–10.8)
WBC # FLD AUTO: 8.94 K/UL — SIGNIFICANT CHANGE UP (ref 4.8–10.8)

## 2024-06-12 PROCEDURE — 85025 COMPLETE CBC W/AUTO DIFF WBC: CPT

## 2024-06-12 PROCEDURE — 83036 HEMOGLOBIN GLYCOSYLATED A1C: CPT

## 2024-06-12 PROCEDURE — 99223 1ST HOSP IP/OBS HIGH 75: CPT | Mod: AI

## 2024-06-12 PROCEDURE — 99285 EMERGENCY DEPT VISIT HI MDM: CPT | Mod: FS

## 2024-06-12 PROCEDURE — 80053 COMPREHEN METABOLIC PANEL: CPT

## 2024-06-12 PROCEDURE — 36415 COLL VENOUS BLD VENIPUNCTURE: CPT

## 2024-06-12 PROCEDURE — 93970 EXTREMITY STUDY: CPT | Mod: 26

## 2024-06-12 RX ORDER — LISINOPRIL 2.5 MG/1
10 TABLET ORAL
Refills: 0 | Status: ACTIVE | OUTPATIENT
Start: 2024-06-12 | End: 2025-05-11

## 2024-06-12 RX ORDER — ONDANSETRON 8 MG/1
4 TABLET, FILM COATED ORAL EVERY 8 HOURS
Refills: 0 | Status: ACTIVE | OUTPATIENT
Start: 2024-06-12 | End: 2025-05-11

## 2024-06-12 RX ORDER — VANCOMYCIN HCL 1 G
1000 VIAL (EA) INTRAVENOUS EVERY 12 HOURS
Refills: 0 | Status: DISCONTINUED | OUTPATIENT
Start: 2024-06-12 | End: 2024-06-13

## 2024-06-12 RX ORDER — LANOLIN ALCOHOL/MO/W.PET/CERES
3 CREAM (GRAM) TOPICAL AT BEDTIME
Refills: 0 | Status: ACTIVE | OUTPATIENT
Start: 2024-06-12 | End: 2025-05-11

## 2024-06-12 RX ORDER — LEVOTHYROXINE SODIUM 125 MCG
150 TABLET ORAL DAILY
Refills: 0 | Status: ACTIVE | OUTPATIENT
Start: 2024-06-12 | End: 2025-05-11

## 2024-06-12 RX ORDER — MONTELUKAST 4 MG/1
10 TABLET, CHEWABLE ORAL DAILY
Refills: 0 | Status: ACTIVE | OUTPATIENT
Start: 2024-06-12 | End: 2025-05-11

## 2024-06-12 RX ORDER — ATORVASTATIN CALCIUM 80 MG/1
40 TABLET, FILM COATED ORAL AT BEDTIME
Refills: 0 | Status: ACTIVE | OUTPATIENT
Start: 2024-06-12 | End: 2025-05-11

## 2024-06-12 RX ORDER — ACETAMINOPHEN 500 MG
650 TABLET ORAL ONCE
Refills: 0 | Status: COMPLETED | OUTPATIENT
Start: 2024-06-12 | End: 2024-06-12

## 2024-06-12 RX ORDER — CEFEPIME 1 G/1
1000 INJECTION, POWDER, FOR SOLUTION INTRAMUSCULAR; INTRAVENOUS EVERY 8 HOURS
Refills: 0 | Status: DISCONTINUED | OUTPATIENT
Start: 2024-06-12 | End: 2024-06-13

## 2024-06-12 RX ORDER — ACETAMINOPHEN 500 MG
650 TABLET ORAL EVERY 6 HOURS
Refills: 0 | Status: ACTIVE | OUTPATIENT
Start: 2024-06-12 | End: 2025-05-11

## 2024-06-12 RX ORDER — CHLORHEXIDINE GLUCONATE 213 G/1000ML
1 SOLUTION TOPICAL
Refills: 0 | Status: ACTIVE | OUTPATIENT
Start: 2024-06-12 | End: 2025-05-11

## 2024-06-12 RX ORDER — SODIUM CHLORIDE 9 MG/ML
1000 INJECTION INTRAMUSCULAR; INTRAVENOUS; SUBCUTANEOUS ONCE
Refills: 0 | Status: COMPLETED | OUTPATIENT
Start: 2024-06-12 | End: 2024-06-12

## 2024-06-12 RX ORDER — METOPROLOL TARTRATE 50 MG
50 TABLET ORAL DAILY
Refills: 0 | Status: ACTIVE | OUTPATIENT
Start: 2024-06-12 | End: 2025-05-11

## 2024-06-12 RX ORDER — ENOXAPARIN SODIUM 100 MG/ML
40 INJECTION SUBCUTANEOUS EVERY 24 HOURS
Refills: 0 | Status: ACTIVE | OUTPATIENT
Start: 2024-06-12 | End: 2025-05-11

## 2024-06-12 RX ORDER — CEFEPIME 1 G/1
2000 INJECTION, POWDER, FOR SOLUTION INTRAMUSCULAR; INTRAVENOUS ONCE
Refills: 0 | Status: COMPLETED | OUTPATIENT
Start: 2024-06-12 | End: 2024-06-12

## 2024-06-12 RX ADMIN — ATORVASTATIN CALCIUM 40 MILLIGRAM(S): 80 TABLET, FILM COATED ORAL at 21:26

## 2024-06-12 RX ADMIN — ENOXAPARIN SODIUM 40 MILLIGRAM(S): 100 INJECTION SUBCUTANEOUS at 18:52

## 2024-06-12 RX ADMIN — LISINOPRIL 10 MILLIGRAM(S): 2.5 TABLET ORAL at 18:52

## 2024-06-12 RX ADMIN — Medication 650 MILLIGRAM(S): at 20:35

## 2024-06-12 RX ADMIN — CEFEPIME 100 MILLIGRAM(S): 1 INJECTION, POWDER, FOR SOLUTION INTRAMUSCULAR; INTRAVENOUS at 21:26

## 2024-06-12 RX ADMIN — Medication 250 MILLIGRAM(S): at 18:52

## 2024-06-12 RX ADMIN — MONTELUKAST 10 MILLIGRAM(S): 4 TABLET, CHEWABLE ORAL at 21:26

## 2024-06-12 RX ADMIN — CEFEPIME 100 MILLIGRAM(S): 1 INJECTION, POWDER, FOR SOLUTION INTRAMUSCULAR; INTRAVENOUS at 13:05

## 2024-06-12 RX ADMIN — SODIUM CHLORIDE 1000 MILLILITER(S): 9 INJECTION INTRAMUSCULAR; INTRAVENOUS; SUBCUTANEOUS at 13:05

## 2024-06-12 RX ADMIN — Medication 650 MILLIGRAM(S): at 13:06

## 2024-06-12 NOTE — ED PROVIDER NOTE - PHYSICAL EXAMINATION
--EXAM--  VITAL SIGNS: I have reviewed vs documented at present.  CONSTITUTIONAL: Well-developed; well-nourished; in no acute distress.   SKIN: Warm and dry, no acute rash.     CARD: S1, S2, Regular rate and rhythm.   RESP: No wheezes, rales or rhonchi.  ABD: Normal bowel sounds; soft; non-distended; non-tender.  EXT:Right lower extremity positive swelling noted no calf tenderness positive erythema no drainage

## 2024-06-12 NOTE — PATIENT PROFILE ADULT - NSTOBACCONEVERSMOKERY/N_GEN_A
Vaccine Information Statement(s) was given today. This has been reviewed, questions answered, and verbal consent given by Patient for injection(s) and administration of Influenza (Inactivated).    1. Does the patient have a moderate to severe fever?  No  2. Has the patient had a serious reaction to a flu shot before?   No  3. Has the patient ever had Guillian Quincy Syndrome within 6 weeks of a previous flu shot?  No  4. Does the patient have a serious allergy to eggs?  No  5. Is the patient less that 6 months of age?  No    Patient is eligible to receive the vaccine based on all questions being answered as 'No'.          Patient tolerated without incident. See immunization grid for documentation.   No

## 2024-06-12 NOTE — H&P ADULT - NSHPLABSRESULTS_GEN_ALL_CORE
13.2   8.94  )-----------( 142      ( 12 Jun 2024 12:35 )             38.5       06-12    128<L>  |  94<L>  |  16  ----------------------------<  97  4.0   |  26  |  1.2    Ca    10.4      12 Jun 2024 12:35    TPro  7.9  /  Alb  4.4  /  TBili  0.9  /  DBili  x   /  AST  21  /  ALT  15  /  AlkPhos  52  06-12          Urinalysis Basic - ( 12 Jun 2024 12:35 )    Color: x / Appearance: x / SG: x / pH: x  Gluc: 97 mg/dL / Ketone: x  / Bili: x / Urobili: x   Blood: x / Protein: x / Nitrite: x   Leuk Esterase: x / RBC: x / WBC x   Sq Epi: x / Non Sq Epi: x / Bacteria: x      Lactate Trend  06-12 @ 13:00 Lactate:1.5     BLE Venous Doppler    IMPRESSION:  No evidence of deep venous thrombosis in either lower extremity.

## 2024-06-12 NOTE — ED PROVIDER NOTE - OBJECTIVE STATEMENT
68-year-old male presents ED for evaluation of right lower extremity pain swelling and redness.  Patient has noticed symptoms for about 3 days

## 2024-06-12 NOTE — PATIENT PROFILE ADULT - PATIENT REPRESENTATIVE: ( YOU CAN CHOOSE ANY PERSON THAT CAN ASSIST YOU WITH YOUR HEALTH CARE PREFERENCES, DOES NOT HAVE TO BE A SPOUSE, IMMEDIATE FAMILY OR SIGNIFICANT OTHER/PARTNER)
transfer at [facility] for neuro eval  hx acute motor axonal neuropathy same name as above

## 2024-06-12 NOTE — H&P ADULT - NSHPPHYSICALEXAM_GEN_ALL_CORE
Vital Signs (24 Hrs):  T(C): 38.5 (06-12-24 @ 12:15), Max: 38.5 (06-12-24 @ 12:15)  HR: 94 (06-12-24 @ 12:15) (94 - 94)  BP: 128/89 (06-12-24 @ 12:15) (128/89 - 128/89)  RR: 18 (06-12-24 @ 12:15) (18 - 18)  SpO2: 97% (06-12-24 @ 12:15) (97% - 97%)    PHYSICAL EXAM:  GENERAL: NAD, well-developed  SKIN: + erythema on RLE from ankle to just proximal to knee, circumferential.  hot to touch; +TTP  HEAD:  Atraumatic, Normocephalic  EYES: EOMI, PERRLA, conjunctiva and sclera clear  NECK: Supple, No JVD  CHEST/LUNG: Clear to auscultation bilaterally; No wheeze  HEART: Regular rate and rhythm; No murmurs, rubs, or gallops  ABDOMEN: Soft, Nontender, Nondistended; Bowel sounds present  EXTREMITIES:  No clubbing, cyanosis. + edema RLE  CNS: AAOx3

## 2024-06-12 NOTE — ED PROVIDER NOTE - CLINICAL SUMMARY MEDICAL DECISION MAKING FREE TEXT BOX
Patient presented today with cellulitis of the right lower extremity.  Patient also has systemic signs and symptoms of infection at this time.  Patient will require admission for further evaluation and care.  Patient has fevers during ED stay. Patient presented today with cellulitis of the right lower extremity.  Patient also has systemic signs and symptoms of infection at this time.  Patient will require admission for further evaluation and care.  Patient has fevers during ED stay.  Pt at risk for sepsis. given antibiotics and fluids. No wbc or elevation in lactate. Patient presented today with cellulitis of the right lower extremity.  Patient also has systemic signs and symptoms of infection at this time.  Patient will require admission for further evaluation and care.  Patient has fevers during ED stay.  given antibiotics and fluids. No wbc or elevation in lactate.  Patient does not have sepsis at this time, but is high risk and thus admitted.

## 2024-06-12 NOTE — ED PROVIDER NOTE - NS ED MD TWO NIGHTS YN
"Subjective:       Patient ID: Michelle F Jeansonne is a 39 y.o. female.    Chief Complaint: GI Problem (stomach discomfort )     Michelle F Jeansonne is a 39 y.o.  female who presents for GI Problem (stomach discomfort )  .  C/o of generalized abdominal pain and tenderness, associated with bloating. Intermittent sharp pains. No nausea, vomiting or diarrhea.  Not daily,but three time weekly.   last episode was last night. Describes some mild anorexia, fatigue and abdominal tenderness. She has noticed hyperactive bowel sounds. interfeared with her sleep.   Started with night sweats about 8-9 months ago. No unexplained wt loss.  Tried pepto bismal and had mild relief.     LMP last week.  She has recently started taking her ocps without a break except every 90 days.       GI Problem   Primary symptoms do not include fever, nausea, vomiting, diarrhea, melena, arthralgias or rash.   The illness is also significant for anorexia and bloating. The illness does not include chills, constipation, tenesmus or back pain.     Review of Systems   Constitutional: Negative for chills and fever.   Gastrointestinal: Positive for anorexia and bloating. Negative for constipation, diarrhea, melena, nausea and vomiting.   Musculoskeletal: Negative for arthralgias and back pain.   Skin: Negative for rash.       Patient Active Problem List   Diagnosis    Hyperopia    History of snoring    Paresthesia of left arm       Past Medical History:   Diagnosis Date    Miscarriage        Past Surgical History:   Procedure Laterality Date    TONSILLECTOMY      age 8       Family History   Problem Relation Age of Onset    Glaucoma Father     Cataracts Father     Coronary artery disease Paternal Uncle 50     half        Social History   Substance Use Topics    Smoking status: Never Smoker    Smokeless tobacco: Never Used    Alcohol use Yes      Comment: once a month       Objective:   Blood pressure 122/80, pulse 80, height 5' 3" (1.6 m), " weight 55.2 kg (121 lb 11.1 oz), SpO2 98 %.     Physical Exam   Constitutional: She is oriented to person, place, and time. She appears well-developed and well-nourished. No distress.   HENT:   Head: Normocephalic and atraumatic.   Right Ear: External ear normal.   Left Ear: External ear normal.   Eyes: Conjunctivae are normal. No scleral icterus.   Neck: No JVD present. No thyromegaly present.   Cardiovascular: Normal heart sounds.  Exam reveals no gallop and no friction rub.    No murmur heard.  Pulmonary/Chest: Effort normal and breath sounds normal. She has no wheezes. She has no rales.   Abdominal: Soft. Bowel sounds are normal. She exhibits no distension and no mass. There is tenderness (difuse). There is no rebound and no guarding.   Musculoskeletal: She exhibits no edema or tenderness.   Lymphadenopathy:     She has no cervical adenopathy.   Neurological: She is alert and oriented to person, place, and time.   Skin: Skin is warm and dry.   Psychiatric: She has a normal mood and affect. Thought content normal.       Prior labs reviewed  Assessment/Plan:        Shazia was seen today for gi problem.    Diagnoses and all orders for this visit:    Periumbilical abdominal pain  -     Amylase; Future  -     Lipase; Future  -     H.Pylori Antibody IgG; Future  -     US Abdomen Complete; Future  -     START omeprazole (PRILOSEC) 20 MG capsule; Take 1 capsule (20 mg total) by mouth once daily.  -     CBC auto differential; Future  Avoid nsaids, spicy, acidic foods  Trial of avoiding lactose  Consider gluten free diet if no improvement  Add probiotic daily       Yes

## 2024-06-12 NOTE — H&P ADULT - NS ATTEND AMEND GEN_ALL_CORE FT
Patient presents with LLE Cellulitis meeting Sepsis criteria, admit for broad spectrum antibiotics (vancomycin and cefepime) and monitor for improvement.

## 2024-06-12 NOTE — H&P ADULT - HISTORY OF PRESENT ILLNESS
68M w/PMHx of HTN, HLD, Thyroid Ca s/p thyroidectomy, Asthma, JASE on CPAP presents to ED with complaints RLE erythema and edema x 1 day.  Patient reports he awoke yesterday AM with chills and erythema to RLE.  He reports the chills abated at around 1000 but the erythema persisted.  He reports drinking increased fluids but symptoms remained.  Pain began to increase and today erythema got worse so he came to ED for evaluation.  Patient denies any recent trauma to area, no skin breaks.  + chills, no fever reported.  No CP/SOB.  No abdominal or urinary complaints.

## 2024-06-12 NOTE — H&P ADULT - ASSESSMENT
68M w/PMHx of HTN, HLD, Thyroid Ca s/p thyroidectomy, Asthma, JASE on CPAP presents to ED with complaints RLE erythema and edema x 1 day, admitted to medicine service for further management.      #Sepsis 2/2 RLE Cellulitis (Temp 101.3, HR 94)  - admit to medicine service  - Vancomycin 1gm IVPB q12h w/trough q4th dose  - BLE Venous Duplex negative for DVT  - FU BCx drawn in ED  - ID c/s  - Trend Fever curve/WBC count    #Hyponatremia  - patient admits to drinking copious fluids yesterday  - calculated osmo 267  - add sOsmo to admit labs  - check Elio+ and uOsmo  - Hold any further IVF for now (s/p 1L NS in ED)  - recheck Na+ in AM    #HTN  - c/w home meds  - DASH Diet    #HLD  - c/w Statin    #Hx of thyroid Ca s/p thyroidectomy  - c/w Levo    #JASE  - CPAP at HS, patient will bring from home    Diet: DASH  Activity: OOB  DVT PPX: Lovenox  Code status: Full   Dispo: Full  CHG 2% Wipes QD  68M w/PMHx of HTN, HLD, Thyroid Ca s/p thyroidectomy, Asthma, JASE on CPAP presents to ED with complaints RLE erythema and edema x 1 day, admitted to medicine service for further management.      #Sepsis 2/2 RLE Cellulitis (Temp 101.3, HR 94)  - admit to medicine service  - Vancomycin/Cefepime   - BLE Venous Duplex negative for DVT  - FU BCx drawn in ED  - ID c/s  - Trend Fever curve/WBC count    #Hyponatremia  - patient admits to drinking copious fluids yesterday  - calculated osmo 267  - Hold any further IVF for now (s/p 1L NS in ED)  - recheck Na+ in AM  - if worsening check sOsmo, Elio+ and uOsmo      #HTN  - c/w home meds  - DASH Diet    #HLD  - c/w Statin    #Hx of thyroid Ca s/p thyroidectomy  - c/w Levo    #JASE  - CPAP at HS, patient will bring from home    Diet: DASH  Activity: OOB  DVT PPX: Lovenox  Code status: Full   Dispo: Full  CHG 2% Wipes QD

## 2024-06-13 LAB
ALBUMIN SERPL ELPH-MCNC: 3.8 G/DL — SIGNIFICANT CHANGE UP (ref 3.5–5.2)
ALP SERPL-CCNC: 60 U/L — SIGNIFICANT CHANGE UP (ref 30–115)
ALT FLD-CCNC: 21 U/L — SIGNIFICANT CHANGE UP (ref 0–41)
ANION GAP SERPL CALC-SCNC: 9 MMOL/L — SIGNIFICANT CHANGE UP (ref 7–14)
AST SERPL-CCNC: 30 U/L — SIGNIFICANT CHANGE UP (ref 0–41)
BASOPHILS # BLD AUTO: 0 K/UL — SIGNIFICANT CHANGE UP (ref 0–0.2)
BASOPHILS NFR BLD AUTO: 0 % — SIGNIFICANT CHANGE UP (ref 0–1)
BILIRUB SERPL-MCNC: 0.6 MG/DL — SIGNIFICANT CHANGE UP (ref 0.2–1.2)
BUN SERPL-MCNC: 14 MG/DL — SIGNIFICANT CHANGE UP (ref 10–20)
CALCIUM SERPL-MCNC: 9.4 MG/DL — SIGNIFICANT CHANGE UP (ref 8.4–10.5)
CHLORIDE SERPL-SCNC: 97 MMOL/L — LOW (ref 98–110)
CO2 SERPL-SCNC: 26 MMOL/L — SIGNIFICANT CHANGE UP (ref 17–32)
CREAT SERPL-MCNC: 1.2 MG/DL — SIGNIFICANT CHANGE UP (ref 0.7–1.5)
EGFR: 66 ML/MIN/1.73M2 — SIGNIFICANT CHANGE UP
EOSINOPHIL NFR BLD AUTO: 0 % — SIGNIFICANT CHANGE UP (ref 0–8)
GLUCOSE SERPL-MCNC: 107 MG/DL — HIGH (ref 70–99)
HCT VFR BLD CALC: 36 % — LOW (ref 42–52)
HGB BLD-MCNC: 12.1 G/DL — LOW (ref 14–18)
LYMPHOCYTES # BLD AUTO: 0.31 K/UL — LOW (ref 1.2–3.4)
LYMPHOCYTES # BLD AUTO: 6 % — LOW (ref 20.5–51.1)
MANUAL SMEAR VERIFICATION: SIGNIFICANT CHANGE UP
MCHC RBC-ENTMCNC: 32.7 PG — HIGH (ref 27–31)
MCHC RBC-ENTMCNC: 33.6 G/DL — SIGNIFICANT CHANGE UP (ref 32–37)
MCV RBC AUTO: 97.3 FL — HIGH (ref 80–94)
MONOCYTES # BLD AUTO: 0.21 K/UL — SIGNIFICANT CHANGE UP (ref 0.1–0.6)
MONOCYTES NFR BLD AUTO: 4 % — SIGNIFICANT CHANGE UP (ref 1.7–9.3)
NEUTROPHILS # BLD AUTO: 4.64 K/UL — SIGNIFICANT CHANGE UP (ref 1.4–6.5)
NEUTROPHILS NFR BLD AUTO: 86 % — HIGH (ref 42.2–75.2)
NEUTS BAND # BLD: 4 % — SIGNIFICANT CHANGE UP (ref 0–6)
NRBC # BLD: 0 /100 WBCS — SIGNIFICANT CHANGE UP (ref 0–0)
NRBC # BLD: SIGNIFICANT CHANGE UP /100 WBCS (ref 0–0)
PLAT MORPH BLD: NORMAL — SIGNIFICANT CHANGE UP
PLATELET # BLD AUTO: 128 K/UL — LOW (ref 130–400)
PLATELET COUNT - ESTIMATE: NORMAL — SIGNIFICANT CHANGE UP
PMV BLD: 10.6 FL — HIGH (ref 7.4–10.4)
POTASSIUM SERPL-MCNC: 4.2 MMOL/L — SIGNIFICANT CHANGE UP (ref 3.5–5)
POTASSIUM SERPL-SCNC: 4.2 MMOL/L — SIGNIFICANT CHANGE UP (ref 3.5–5)
PROT SERPL-MCNC: 7 G/DL — SIGNIFICANT CHANGE UP (ref 6–8)
RBC # BLD: 3.7 M/UL — LOW (ref 4.7–6.1)
RBC # FLD: 13.5 % — SIGNIFICANT CHANGE UP (ref 11.5–14.5)
RBC BLD AUTO: NORMAL — SIGNIFICANT CHANGE UP
SODIUM SERPL-SCNC: 132 MMOL/L — LOW (ref 135–146)
WBC # BLD: 5.15 K/UL — SIGNIFICANT CHANGE UP (ref 4.8–10.8)
WBC # FLD AUTO: 5.15 K/UL — SIGNIFICANT CHANGE UP (ref 4.8–10.8)

## 2024-06-13 PROCEDURE — 99232 SBSQ HOSP IP/OBS MODERATE 35: CPT

## 2024-06-13 RX ORDER — CEFAZOLIN SODIUM 1 G
2000 VIAL (EA) INJECTION EVERY 8 HOURS
Refills: 0 | Status: DISCONTINUED | OUTPATIENT
Start: 2024-06-13 | End: 2024-06-14

## 2024-06-13 RX ADMIN — Medication 50 MILLIGRAM(S): at 05:27

## 2024-06-13 RX ADMIN — Medication 250 MILLIGRAM(S): at 06:08

## 2024-06-13 RX ADMIN — MONTELUKAST 10 MILLIGRAM(S): 4 TABLET, CHEWABLE ORAL at 12:00

## 2024-06-13 RX ADMIN — CEFEPIME 100 MILLIGRAM(S): 1 INJECTION, POWDER, FOR SOLUTION INTRAMUSCULAR; INTRAVENOUS at 05:27

## 2024-06-13 RX ADMIN — CEFEPIME 100 MILLIGRAM(S): 1 INJECTION, POWDER, FOR SOLUTION INTRAMUSCULAR; INTRAVENOUS at 13:29

## 2024-06-13 RX ADMIN — Medication 100 MILLIGRAM(S): at 21:07

## 2024-06-13 RX ADMIN — ATORVASTATIN CALCIUM 40 MILLIGRAM(S): 80 TABLET, FILM COATED ORAL at 21:07

## 2024-06-13 RX ADMIN — CHLORHEXIDINE GLUCONATE 1 APPLICATION(S): 213 SOLUTION TOPICAL at 05:27

## 2024-06-13 RX ADMIN — Medication 250 MILLIGRAM(S): at 17:29

## 2024-06-13 RX ADMIN — Medication 150 MICROGRAM(S): at 05:27

## 2024-06-13 RX ADMIN — LISINOPRIL 10 MILLIGRAM(S): 2.5 TABLET ORAL at 17:36

## 2024-06-13 RX ADMIN — LISINOPRIL 10 MILLIGRAM(S): 2.5 TABLET ORAL at 05:27

## 2024-06-13 NOTE — CONSULT NOTE ADULT - SUBJECTIVE AND OBJECTIVE BOX
INFECTIOUS DISEASE CONSULT NOTE    Patient is a 68y old  Male who presents with a chief complaint of RLE erythema x 1 day (12 Jun 2024 14:24)    HPI:  68M w/PMHx of HTN, HLD, Thyroid Ca s/p thyroidectomy, Asthma, JASE on CPAP presents to ED with complaints RLE erythema and edema x 1 day.  Patient reports he awoke yesterday AM with chills and erythema to RLE.  He reports the chills abated at around 1000 but the erythema persisted.  He reports drinking increased fluids but symptoms remained.  Pain began to increase and today erythema got worse so he came to ED for evaluation.  Patient denies any recent trauma to area, no skin breaks.  + chills, no fever reported.  No CP/SOB.  No abdominal or urinary complaints.     (12 Jun 2024 14:24)         Prior hospital charts reviewed [Yes]  Primary team notes reviewed [Yes]  Other consultant notes reviewed [Yes]    REVIEW OF SYSTEMS:      PAST MEDICAL & SURGICAL HISTORY:  HTN (hypertension)      Hypercholesteremia      Thyroid cancer      Asthma      H/O thyroidectomy          SOCIAL HISTORY:  - Born in _____, migrated to  in 19XX  - Currently working as / Retired  - Lives with _____; no pets  - No recent travel  - Denies tobacco use  - Denies alcohol use  - Denies illicit drug use  - Currently sexually active, has one male/female sexual partner    FAMILY HISTORY:      Allergies:  No Known Allergies      ANTIMICROBIALS:  cefepime   IVPB 1000 every 8 hours  vancomycin  IVPB 1000 every 12 hours      ANTIMICROBIALS (past 90 days):  MEDICATIONS  (STANDING):  cefepime   IVPB   100 mL/Hr IV Intermittent (06-12-24 @ 13:05)    cefepime   IVPB   100 mL/Hr IV Intermittent (06-13-24 @ 13:29)   100 mL/Hr IV Intermittent (06-13-24 @ 05:27)   100 mL/Hr IV Intermittent (06-12-24 @ 21:26)    vancomycin  IVPB   250 mL/Hr IV Intermittent (06-13-24 @ 06:08)   250 mL/Hr IV Intermittent (06-12-24 @ 18:52)        OTHER MEDS:   MEDICATIONS  (STANDING):  acetaminophen     Tablet .. 650 every 6 hours PRN  aluminum hydroxide/magnesium hydroxide/simethicone Suspension 30 every 4 hours PRN  atorvastatin 40 at bedtime  enoxaparin Injectable 40 every 24 hours  hydrochlorothiazide 12.5 daily  levothyroxine 150 daily  lisinopril 10 two times a day  melatonin 3 at bedtime PRN  metoprolol succinate ER 50 daily  montelukast 10 daily  ondansetron Injectable 4 every 8 hours PRN      VITALS:  Vital Signs Last 24 Hrs  T(F): 100.2 (06-13-24 @ 14:18), Max: 101.4 (06-12-24 @ 20:45)    Vital Signs Last 24 Hrs  HR: 82 (06-13-24 @ 14:18) (74 - 96)  BP: 105/66 (06-13-24 @ 14:18) (101/63 - 143/73)  RR: 18 (06-13-24 @ 14:18)  SpO2: 98% (06-12-24 @ 16:41) (98% - 98%)  Wt(kg): --    EXAM:    Labs:                        12.1   5.15  )-----------( 128      ( 13 Jun 2024 07:40 )             36.0     06-13    132<L>  |  97<L>  |  14  ----------------------------<  107<H>  4.2   |  26  |  1.2    Ca    9.4      13 Jun 2024 07:40    TPro  7.0  /  Alb  3.8  /  TBili  0.6  /  DBili  x   /  AST  30  /  ALT  21  /  AlkPhos  60  06-13      WBC Trend:  WBC Count: 5.15 (06-13-24 @ 07:40)  WBC Count: 8.94 (06-12-24 @ 12:35)      Auto Neutrophil #: 4.64 K/uL (06-13-24 @ 07:40)  Band Neutrophils %: 4.0 % (06-13-24 @ 07:40)  Auto Neutrophil #: 7.87 K/uL (06-12-24 @ 12:35)      Creatine Trend:  Creatinine: 1.2 (06-13)  Creatinine: 1.2 (06-12)      Liver Biochemical Testing Trend:  Alanine Aminotransferase (ALT/SGPT): 21 (06-13)  Alanine Aminotransferase (ALT/SGPT): 15 (06-12)  Aspartate Aminotransferase (AST/SGOT): 30 (06-13-24 @ 07:40)  Aspartate Aminotransferase (AST/SGOT): 21 (06-12-24 @ 12:35)  Bilirubin Total: 0.6 (06-13)  Bilirubin Total: 0.9 (06-12)      Trend LDH      Auto Eosinophil %: 0.0 % (06-13-24 @ 07:40)  Auto Eosinophil %: 0.1 % (06-12-24 @ 12:35)      Urinalysis Basic - ( 13 Jun 2024 07:40 )    Color: x / Appearance: x / SG: x / pH: x  Gluc: 107 mg/dL / Ketone: x  / Bili: x / Urobili: x   Blood: x / Protein: x / Nitrite: x   Leuk Esterase: x / RBC: x / WBC x   Sq Epi: x / Non Sq Epi: x / Bacteria: x        MICROBIOLOGY:      Lactate, Blood: 1.5 (06-12 @ 13:00)        RADIOLOGY:  imaging below personally reviewed    < from: [facility] Duplex Lower Ext Vein Scan, Bilat (06.12.24 @ 12:55) >  FINDINGS:    RIGHT:  Normal compressibility of the RIGHT common femoral, femoral and popliteal   veins.  Doppler examination shows normal spontaneous and phasic flow.  No RIGHT calf vein thrombosis is detected.    LEFT:  Normal compressibility of the LEFT common femoral, femoral and popliteal   veins.  Doppler examination shows normal spontaneous and phasic flow.  No LEFT calf vein thrombosis is detected.    IMPRESSION:  No evidence of deep venous thrombosis in either lower extremity.    < end of copied text >   INFECTIOUS DISEASE CONSULT NOTE    Patient is a 68y old  Male who presents with a chief complaint of RLE erythema x 1 day (12 Jun 2024 14:24)    HPI:  68M w/PMHx of HTN, HLD, Thyroid Ca s/p thyroidectomy, Asthma, JASE on CPAP presents to ED with complaints RLE erythema and edema x 1 day.  Patient reports he awoke yesterday AM with chills and erythema to RLE.  He reports the chills abated at around 1000 but the erythema persisted.  He reports drinking increased fluids but symptoms remained.  Pain began to increase and today erythema got worse so he came to ED for evaluation.  Patient denies any recent trauma to area, no skin breaks.  + chills, no fever reported.  No CP/SOB.  No abdominal or urinary complaints.     (12 Jun 2024 14:24)      Prior hospital charts reviewed [Yes]  Primary team notes reviewed [Yes]  Other consultant notes reviewed [Yes]    REVIEW OF SYSTEMS:  CONSTITUTIONAL: No fever or chills  HEAD: No lesion on scalp  EYES: No visual disturbance  ENT: No sore throat  RESPIRATORY: No cough, no shortness of breath  CARDIOVASCULAR: No chest pain or palpitations  GASTROINTESTINAL: No abdominal or epigastric pain  GENITOURINARY: No dysuria  NEUROLOGICAL: No headache/dizziness  MUSCULOSKELETAL: No joint pain, erythema, or swelling; no back pain  SKIN: RLE redness, warmth and swelling  All other ROS negative except noted above      PAST MEDICAL & SURGICAL HISTORY:  HTN (hypertension)      Hypercholesteremia      Thyroid cancer      Asthma      H/O thyroidectomy      SOCIAL HISTORY:  - No recent travel  - Denies tobacco use  - Social alcohol use  - Denies illicit drug use    FAMILY HISTORY:  NO family history of thyroid cancer    Allergies:  No Known Allergies      ANTIMICROBIALS:  cefepime   IVPB 1000 every 8 hours  vancomycin  IVPB 1000 every 12 hours      ANTIMICROBIALS (past 90 days):  MEDICATIONS  (STANDING):  cefepime   IVPB   100 mL/Hr IV Intermittent (06-12-24 @ 13:05)    cefepime   IVPB   100 mL/Hr IV Intermittent (06-13-24 @ 13:29)   100 mL/Hr IV Intermittent (06-13-24 @ 05:27)   100 mL/Hr IV Intermittent (06-12-24 @ 21:26)    vancomycin  IVPB   250 mL/Hr IV Intermittent (06-13-24 @ 06:08)   250 mL/Hr IV Intermittent (06-12-24 @ 18:52)        OTHER MEDS:   MEDICATIONS  (STANDING):  acetaminophen     Tablet .. 650 every 6 hours PRN  aluminum hydroxide/magnesium hydroxide/simethicone Suspension 30 every 4 hours PRN  atorvastatin 40 at bedtime  enoxaparin Injectable 40 every 24 hours  hydrochlorothiazide 12.5 daily  levothyroxine 150 daily  lisinopril 10 two times a day  melatonin 3 at bedtime PRN  metoprolol succinate ER 50 daily  montelukast 10 daily  ondansetron Injectable 4 every 8 hours PRN      VITALS:  Vital Signs Last 24 Hrs  T(F): 100.2 (06-13-24 @ 14:18), Max: 101.4 (06-12-24 @ 20:45)    Vital Signs Last 24 Hrs  HR: 82 (06-13-24 @ 14:18) (74 - 96)  BP: 105/66 (06-13-24 @ 14:18) (101/63 - 143/73)  RR: 18 (06-13-24 @ 14:18)  SpO2: 98% (06-12-24 @ 16:41) (98% - 98%)  Wt(kg): --    EXAM:  GENERAL: NAD, lying in bed  HEAD: No head lesions  EYES: Conjunctiva pink and cornea white  EAR, NOSE, MOUTH, THROAT: Normal external ears and nose, no discharges; moist mucous membranes  NECK: Supple, nontender to palpation; no JVD  RESPIRATORY: Clear to auscultation bilaterally  CARDIOVASCULAR: S1 S2  GASTROINTESTINAL: Soft, nontender, nondistended; normoactive bowel sounds  EXTREMITIES: No clubbing, cyanosis, or petal edema  NERVOUS SYSTEM: Alert and oriented to person, time, place and situation, speech clear. No focal deficits   MUSCULOSKELETAL: No joint erythema, swelling or pain  SKIN: RLE erythema, swelling, warmth, mild tenderness; no active wound or lesions, no purulence, no superficial thrombophlebitis  PSYCH: Normal affect    Labs:                        12.1   5.15  )-----------( 128      ( 13 Jun 2024 07:40 )             36.0     06-13    132<L>  |  97<L>  |  14  ----------------------------<  107<H>  4.2   |  26  |  1.2    Ca    9.4      13 Jun 2024 07:40    TPro  7.0  /  Alb  3.8  /  TBili  0.6  /  DBili  x   /  AST  30  /  ALT  21  /  AlkPhos  60  06-13      WBC Trend:  WBC Count: 5.15 (06-13-24 @ 07:40)  WBC Count: 8.94 (06-12-24 @ 12:35)      Auto Neutrophil #: 4.64 K/uL (06-13-24 @ 07:40)  Band Neutrophils %: 4.0 % (06-13-24 @ 07:40)  Auto Neutrophil #: 7.87 K/uL (06-12-24 @ 12:35)      Creatine Trend:  Creatinine: 1.2 (06-13)  Creatinine: 1.2 (06-12)      Liver Biochemical Testing Trend:  Alanine Aminotransferase (ALT/SGPT): 21 (06-13)  Alanine Aminotransferase (ALT/SGPT): 15 (06-12)  Aspartate Aminotransferase (AST/SGOT): 30 (06-13-24 @ 07:40)  Aspartate Aminotransferase (AST/SGOT): 21 (06-12-24 @ 12:35)  Bilirubin Total: 0.6 (06-13)  Bilirubin Total: 0.9 (06-12)      Trend LDH      Auto Eosinophil %: 0.0 % (06-13-24 @ 07:40)  Auto Eosinophil %: 0.1 % (06-12-24 @ 12:35)      Urinalysis Basic - ( 13 Jun 2024 07:40 )    Color: x / Appearance: x / SG: x / pH: x  Gluc: 107 mg/dL / Ketone: x  / Bili: x / Urobili: x   Blood: x / Protein: x / Nitrite: x   Leuk Esterase: x / RBC: x / WBC x   Sq Epi: x / Non Sq Epi: x / Bacteria: x        MICROBIOLOGY:      Lactate, Blood: 1.5 (06-12 @ 13:00)        RADIOLOGY:  imaging below personally reviewed    < from: VA Duplex Lower Ext Vein Scan, Bilat (06.12.24 @ 12:55) >  FINDINGS:    RIGHT:  Normal compressibility of the RIGHT common femoral, femoral and popliteal   veins.  Doppler examination shows normal spontaneous and phasic flow.  No RIGHT calf vein thrombosis is detected.    LEFT:  Normal compressibility of the LEFT common femoral, femoral and popliteal   veins.  Doppler examination shows normal spontaneous and phasic flow.  No LEFT calf vein thrombosis is detected.    IMPRESSION:  No evidence of deep venous thrombosis in either lower extremity.    < end of copied text >

## 2024-06-14 ENCOUNTER — TRANSCRIPTION ENCOUNTER (OUTPATIENT)
Age: 69
End: 2024-06-14

## 2024-06-14 VITALS
RESPIRATION RATE: 18 BRPM | DIASTOLIC BLOOD PRESSURE: 72 MMHG | HEART RATE: 72 BPM | TEMPERATURE: 99 F | SYSTOLIC BLOOD PRESSURE: 109 MMHG

## 2024-06-14 LAB
A1C WITH ESTIMATED AVERAGE GLUCOSE RESULT: 5.6 % — SIGNIFICANT CHANGE UP (ref 4–5.6)
ALBUMIN SERPL ELPH-MCNC: 3.7 G/DL — SIGNIFICANT CHANGE UP (ref 3.5–5.2)
ALP SERPL-CCNC: 64 U/L — SIGNIFICANT CHANGE UP (ref 30–115)
ALT FLD-CCNC: 33 U/L — SIGNIFICANT CHANGE UP (ref 0–41)
ANION GAP SERPL CALC-SCNC: 8 MMOL/L — SIGNIFICANT CHANGE UP (ref 7–14)
AST SERPL-CCNC: 38 U/L — SIGNIFICANT CHANGE UP (ref 0–41)
BASOPHILS # BLD AUTO: 0.01 K/UL — SIGNIFICANT CHANGE UP (ref 0–0.2)
BASOPHILS NFR BLD AUTO: 0.3 % — SIGNIFICANT CHANGE UP (ref 0–1)
BILIRUB SERPL-MCNC: 0.4 MG/DL — SIGNIFICANT CHANGE UP (ref 0.2–1.2)
BUN SERPL-MCNC: 13 MG/DL — SIGNIFICANT CHANGE UP (ref 10–20)
CALCIUM SERPL-MCNC: 9.6 MG/DL — SIGNIFICANT CHANGE UP (ref 8.4–10.5)
CHLORIDE SERPL-SCNC: 100 MMOL/L — SIGNIFICANT CHANGE UP (ref 98–110)
CO2 SERPL-SCNC: 27 MMOL/L — SIGNIFICANT CHANGE UP (ref 17–32)
CREAT SERPL-MCNC: 1.2 MG/DL — SIGNIFICANT CHANGE UP (ref 0.7–1.5)
EGFR: 66 ML/MIN/1.73M2 — SIGNIFICANT CHANGE UP
EOSINOPHIL # BLD AUTO: 0.11 K/UL — SIGNIFICANT CHANGE UP (ref 0–0.7)
EOSINOPHIL NFR BLD AUTO: 2.9 % — SIGNIFICANT CHANGE UP (ref 0–8)
ESTIMATED AVERAGE GLUCOSE: 114 MG/DL — SIGNIFICANT CHANGE UP (ref 68–114)
GLUCOSE SERPL-MCNC: 102 MG/DL — HIGH (ref 70–99)
HCT VFR BLD CALC: 35.5 % — LOW (ref 42–52)
HGB BLD-MCNC: 12.2 G/DL — LOW (ref 14–18)
IMM GRANULOCYTES NFR BLD AUTO: 0.3 % — SIGNIFICANT CHANGE UP (ref 0.1–0.3)
LYMPHOCYTES # BLD AUTO: 0.52 K/UL — LOW (ref 1.2–3.4)
LYMPHOCYTES # BLD AUTO: 13.6 % — LOW (ref 20.5–51.1)
MCHC RBC-ENTMCNC: 33 PG — HIGH (ref 27–31)
MCHC RBC-ENTMCNC: 34.4 G/DL — SIGNIFICANT CHANGE UP (ref 32–37)
MCV RBC AUTO: 95.9 FL — HIGH (ref 80–94)
MONOCYTES # BLD AUTO: 0.47 K/UL — SIGNIFICANT CHANGE UP (ref 0.1–0.6)
MONOCYTES NFR BLD AUTO: 12.3 % — HIGH (ref 1.7–9.3)
NEUTROPHILS # BLD AUTO: 2.71 K/UL — SIGNIFICANT CHANGE UP (ref 1.4–6.5)
NEUTROPHILS NFR BLD AUTO: 70.6 % — SIGNIFICANT CHANGE UP (ref 42.2–75.2)
NRBC # BLD: 0 /100 WBCS — SIGNIFICANT CHANGE UP (ref 0–0)
PLATELET # BLD AUTO: 122 K/UL — LOW (ref 130–400)
PMV BLD: 10.1 FL — SIGNIFICANT CHANGE UP (ref 7.4–10.4)
POTASSIUM SERPL-MCNC: 4.2 MMOL/L — SIGNIFICANT CHANGE UP (ref 3.5–5)
POTASSIUM SERPL-SCNC: 4.2 MMOL/L — SIGNIFICANT CHANGE UP (ref 3.5–5)
PROT SERPL-MCNC: 6.8 G/DL — SIGNIFICANT CHANGE UP (ref 6–8)
RBC # BLD: 3.7 M/UL — LOW (ref 4.7–6.1)
RBC # FLD: 13.5 % — SIGNIFICANT CHANGE UP (ref 11.5–14.5)
SODIUM SERPL-SCNC: 135 MMOL/L — SIGNIFICANT CHANGE UP (ref 135–146)
WBC # BLD: 3.83 K/UL — LOW (ref 4.8–10.8)
WBC # FLD AUTO: 3.83 K/UL — LOW (ref 4.8–10.8)

## 2024-06-14 PROCEDURE — 99239 HOSP IP/OBS DSCHRG MGMT >30: CPT

## 2024-06-14 RX ORDER — CEPHALEXIN 500 MG
2 CAPSULE ORAL
Qty: 30 | Refills: 0
Start: 2024-06-14 | End: 2024-06-18

## 2024-06-14 RX ADMIN — MONTELUKAST 10 MILLIGRAM(S): 4 TABLET, CHEWABLE ORAL at 12:00

## 2024-06-14 RX ADMIN — Medication 50 MILLIGRAM(S): at 11:02

## 2024-06-14 RX ADMIN — Medication 100 MILLIGRAM(S): at 05:29

## 2024-06-14 RX ADMIN — Medication 100 MILLIGRAM(S): at 13:28

## 2024-06-14 RX ADMIN — Medication 150 MICROGRAM(S): at 05:57

## 2024-06-14 RX ADMIN — LISINOPRIL 10 MILLIGRAM(S): 2.5 TABLET ORAL at 11:02

## 2024-06-14 RX ADMIN — CHLORHEXIDINE GLUCONATE 1 APPLICATION(S): 213 SOLUTION TOPICAL at 06:03

## 2024-06-14 NOTE — DISCHARGE NOTE NURSING/CASE MANAGEMENT/SOCIAL WORK - PATIENT PORTAL LINK FT
You can access the FollowMyHealth Patient Portal offered by [facility] by registering at the following website: [email or web address]. By joining [employer]’s FollowMyHealth portal, you will also be able to view your health information using other applications (apps) compatible with our system.

## 2024-06-14 NOTE — DISCHARGE NOTE NURSING/CASE MANAGEMENT/SOCIAL WORK - NSDCPEFALRISK_GEN_ALL_CORE
For information on Fall & Injury Prevention, visit: [email or web address] OR  [email or web address] OR  https://www.cdc.gov/steadi/patient.html

## 2024-06-14 NOTE — DISCHARGE NOTE PROVIDER - NSDCCPCAREPLAN_GEN_ALL_CORE_FT
PRINCIPAL DISCHARGE DIAGNOSIS  Diagnosis: Cellulitis  Assessment and Plan of Treatment: You have a skin infection.  This will be cured with antibiotics.  Please finish antibiotic Keflex as prescribed and follow with your doctor within 2-3 weeks.  Please return to the hospital if symptoms worsen.

## 2024-06-14 NOTE — PROGRESS NOTE ADULT - ASSESSMENT
68M w/PMHx of HTN, HLD, Thyroid Ca s/p thyroidectomy, Asthma, JASE on CPAP presents to ED with complaints RLE erythema and edema x 1 day, admitted to medicine service for further management.      #Sepsis 2/2 RLE Cellulitis (Temp 101.3, HR 94)  - Can de-escalate to Ancef  - BLE Venous Duplex negative for DVT  - FU BCx drawn in ED  - ID consult appreciated  - Trend Fever curve/WBC count    #Hyponatremia  - improved today, continue to monitor    #HTN  - c/w home meds  - DASH Diet    #HLD  - c/w Statin    #Hx of thyroid Ca s/p thyroidectomy  - c/w Levo    #JASE  - CPAP at HS, has home equipment    Diet: DASH  Activity: OOB  DVT PPX: Lovenox  Code status: Full   Dispo: Full  CHG 2% Wipes QD
68M w/PMHx of HTN, HLD, Thyroid Ca s/p thyroidectomy, Asthma, JASE on CPAP presents to ED with complaints RLE erythema and edema x 1.    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  Attending Physician  Division of Infectious Diseases  [facility]  Please contact me via [employer] Teams

## 2024-06-14 NOTE — PROGRESS NOTE ADULT - SUBJECTIVE AND OBJECTIVE BOX
68M w/PMHx of HTN, HLD, Thyroid Ca s/p thyroidectomy, Asthma, JASE on CPAP presents to ED with complaints RLE erythema and edema x 1 day, admitted to medicine service for further management.      Today:  Seen at bedside, no new complaints.              REVIEW OF SYSTEMS:  Leg redness      MEDICATIONS  (STANDING):  atorvastatin 40 milliGRAM(s) Oral at bedtime  cefepime   IVPB 1000 milliGRAM(s) IV Intermittent every 8 hours  chlorhexidine 2% Cloths 1 Application(s) Topical <User Schedule>  enoxaparin Injectable 40 milliGRAM(s) SubCutaneous every 24 hours  hydrochlorothiazide 12.5 milliGRAM(s) Oral daily  levothyroxine 150 MICROGram(s) Oral daily  lisinopril 10 milliGRAM(s) Oral two times a day  metoprolol succinate ER 50 milliGRAM(s) Oral daily  montelukast 10 milliGRAM(s) Oral daily  vancomycin  IVPB 1000 milliGRAM(s) IV Intermittent every 12 hours    MEDICATIONS  (PRN):  acetaminophen     Tablet .. 650 milliGRAM(s) Oral every 6 hours PRN Temp greater or equal to 38C (100.4F), Mild Pain (1 - 3)  aluminum hydroxide/magnesium hydroxide/simethicone Suspension 30 milliLiter(s) Oral every 4 hours PRN Dyspepsia  melatonin 3 milliGRAM(s) Oral at bedtime PRN Insomnia  ondansetron Injectable 4 milliGRAM(s) IV Push every 8 hours PRN Nausea and/or Vomiting      Allergies  No Known Allergies      Vital Signs Last 24 Hrs  T(C): 37.9 (13 Jun 2024 14:18), Max: 38.6 (12 Jun 2024 20:45)  T(F): 100.2 (13 Jun 2024 14:18), Max: 101.4 (12 Jun 2024 20:45)  HR: 82 (13 Jun 2024 14:18) (80 - 91)  BP: 105/66 (13 Jun 2024 14:18) (105/66 - 143/73)  RR: 18 (13 Jun 2024 14:18) (16 - 18)        PHYSICAL EXAM:  GENERAL: NAD, well-developed  SKIN: + erythema on RLE from ankle to just proximal to knee, circumferential.  hot to touch; +TTP  HEAD:  Atraumatic, Normocephalic  EYES: EOMI, PERRLA, conjunctiva and sclera clear  NECK: Supple, No JVD  CHEST/LUNG: Clear to auscultation bilaterally; No wheeze  HEART: Regular rate and rhythm; No murmurs, rubs, or gallops  ABDOMEN: Soft, Nontender, Nondistended; Bowel sounds present  EXTREMITIES:  No clubbing, cyanosis. + edema RLE  CNS: AAOx3    LABS:                        12.1   5.15  )-----------( 128      ( 13 Jun 2024 07:40 )             36.0     06-13    132<L>  |  97<L>  |  14  ----------------------------<  107<H>  4.2   |  26  |  1.2    Ca    9.4      13 Jun 2024 07:40    TPro  7.0  /  Alb  3.8  /  TBili  0.6  /  DBili  x   /  AST  30  /  ALT  21  /  AlkPhos  60  06-13      Urinalysis Basic - ( 13 Jun 2024 07:40 )    Color: x / Appearance: x / SG: x / pH: x  Gluc: 107 mg/dL / Ketone: x  / Bili: x / Urobili: x   Blood: x / Protein: x / Nitrite: x   Leuk Esterase: x / RBC: x / WBC x   Sq Epi: x / Non Sq Epi: x / Bacteria: x    
INFECTIOUS DISEASE FOLLOW UP NOTE:    Interval History/ROS: Patient is a 68y old  Male who presents with a chief complaint of RLE erythema x 1 day (14 Jun 2024 13:42)      Overnight events: No overnight event. Feels well today. Wants to go home.    REVIEW OF SYSTEMS:  CONSTITUTIONAL: No fever or chills  HEAD: No lesion on scalp  EYES: No visual disturbance  ENT: No sore throat  RESPIRATORY: No cough, no shortness of breath  CARDIOVASCULAR: No chest pain or palpitations  GASTROINTESTINAL: No abdominal or epigastric pain  GENITOURINARY: No dysuria  NEUROLOGICAL: No headache/dizziness  MUSCULOSKELETAL: No joint pain, erythema, or swelling; no back pain  SKIN: RLE redness, warmth and swelling  All other ROS negative except noted above    Prior hospital charts reviewed [Yes]  Primary team notes reviewed [Yes]  Other consultant notes reviewed [Yes]    Allergies:  No Known Allergies      ANTIMICROBIALS:   ceFAZolin   IVPB 2000 every 8 hours      OTHER MEDS: MEDICATIONS  (STANDING):  acetaminophen     Tablet .. 650 every 6 hours PRN  aluminum hydroxide/magnesium hydroxide/simethicone Suspension 30 every 4 hours PRN  atorvastatin 40 at bedtime  enoxaparin Injectable 40 every 24 hours  hydrochlorothiazide 12.5 daily  levothyroxine 150 daily  lisinopril 10 two times a day  melatonin 3 at bedtime PRN  metoprolol succinate ER 50 daily  montelukast 10 daily  ondansetron Injectable 4 every 8 hours PRN      Vital Signs Last 24 Hrs  T(F): 99.2 (06-14-24 @ 13:41), Max: 101.4 (06-12-24 @ 20:45)    Vital Signs Last 24 Hrs  HR: 72 (06-14-24 @ 13:41) (62 - 72)  BP: 109/72 (06-14-24 @ 13:41) (100/63 - 109/72)  RR: 18 (06-14-24 @ 13:41)  SpO2: 97% (06-14-24 @ 05:17) (97% - 97%)  Wt(kg): --    EXAM:  GENERAL: NAD, lying in bed  HEAD: No head lesions  EYES: Conjunctiva pink and cornea white  EAR, NOSE, MOUTH, THROAT: Normal external ears and nose, no discharges; moist mucous membranes  NECK: Supple, nontender to palpation; no JVD  RESPIRATORY: Clear to auscultation bilaterally  CARDIOVASCULAR: S1 S2  GASTROINTESTINAL: Soft, nontender, nondistended; normoactive bowel sounds  EXTREMITIES: No clubbing, cyanosis, or petal edema  NERVOUS SYSTEM: Alert and oriented to person, time, place and situation, speech clear. No focal deficits   MUSCULOSKELETAL: No joint erythema, swelling or pain  SKIN: Slightly RLE erythema, swelling, warmth, no tenderness; no active wound or lesions, no purulence, no superficial thrombophlebitis  PSYCH: Normal affect    Labs:                        12.2   3.83  )-----------( 122      ( 14 Jun 2024 07:26 )             35.5     06-14    135  |  100  |  13  ----------------------------<  102<H>  4.2   |  27  |  1.2    Ca    9.6      14 Jun 2024 07:26    TPro  6.8  /  Alb  3.7  /  TBili  0.4  /  DBili  x   /  AST  38  /  ALT  33  /  AlkPhos  64  06-14      WBC Trend:  WBC Count: 3.83 (06-14-24 @ 07:26)  WBC Count: 5.15 (06-13-24 @ 07:40)  WBC Count: 8.94 (06-12-24 @ 12:35)      Creatine Trend:  Creatinine: 1.2 (06-14)  Creatinine: 1.2 (06-13)  Creatinine: 1.2 (06-12)      Liver Biochemical Testing Trend:  Alanine Aminotransferase (ALT/SGPT): 33 (06-14)  Alanine Aminotransferase (ALT/SGPT): 21 (06-13)  Alanine Aminotransferase (ALT/SGPT): 15 (06-12)  Aspartate Aminotransferase (AST/SGOT): 38 (06-14-24 @ 07:26)  Aspartate Aminotransferase (AST/SGOT): 30 (06-13-24 @ 07:40)  Aspartate Aminotransferase (AST/SGOT): 21 (06-12-24 @ 12:35)  Bilirubin Total: 0.4 (06-14)  Bilirubin Total: 0.6 (06-13)  Bilirubin Total: 0.9 (06-12)      Trend LDH      Urinalysis Basic - ( 14 Jun 2024 07:26 )    Color: x / Appearance: x / SG: x / pH: x  Gluc: 102 mg/dL / Ketone: x  / Bili: x / Urobili: x   Blood: x / Protein: x / Nitrite: x   Leuk Esterase: x / RBC: x / WBC x   Sq Epi: x / Non Sq Epi: x / Bacteria: x        MICROBIOLOGY:    Culture - Blood (collected 12 Jun 2024 12:35)  Source: .Blood Blood  Preliminary Report:    No growth at 48 Hours    Culture - Blood (collected 12 Jun 2024 12:35)  Source: .Blood Blood-Peripheral  Preliminary Report:    No growth at 48 Hours      Lactate, Blood: 1.5 (06-12 @ 13:00)      RADIOLOGY:  imaging below personally reviewed    < from: [facility] Duplex Lower Ext Vein Scan, Bilat (06.12.24 @ 12:55) >    IMPRESSION:  No evidence of deep venous thrombosis in either lower extremity.    < end of copied text >

## 2024-06-14 NOTE — DISCHARGE NOTE PROVIDER - CARE PROVIDER_API CALL
JUVENTINO MUELLER  [address] [place] [id number]  Phone: [phone]  Fax: [phone])-  Follow Up Time:

## 2024-06-14 NOTE — DISCHARGE NOTE PROVIDER - HOSPITAL COURSE
68M w/PMHx of HTN, HLD, Thyroid Ca s/p thyroidectomy, Asthma, JASE on CPAP presents to ED with complaints RLE erythema and edema x 1 day, admitted to medicine service for further management.      #Sepsis 2/2 RLE Cellulitis (Temp 101.3, HR 94)  - Can de-escalate from Acef to PO Keflex for discharge- 1G q8h for total of 7 days abx  - BLE Venous Duplex negative for DVT  - Blood Cx NGTD  - ID consult appreciated    #Hyponatremia  - improved    #HTN  - c/w home meds  - DASH Diet    #HLD  - c/w Statin    #Hx of thyroid Ca s/p thyroidectomy  - c/w Levo    #JASE  - CPAP at HS, has home equipment

## 2024-06-14 NOTE — DISCHARGE NOTE PROVIDER - NSDCFUSCHEDAPPT_GEN_ALL_CORE_FT
Yuri Gomez  [facility]  [address]  Scheduled Appointment: 07/11/2024

## 2024-06-14 NOTE — DISCHARGE NOTE PROVIDER - NSDCMRMEDTOKEN_GEN_ALL_CORE_FT
cephalexin 500 mg oral tablet: 2 tab(s) orally 3 times a day  ezetimibe 10 mg oral tablet: 1 tab(s) orally once a day (at bedtime)  hydroCHLOROthiazide 12.5 mg oral capsule: 1 cap(s) orally once a day  levothyroxine 150 mcg (0.15 mg) oral tablet: 1 tab(s) orally once a day  lisinopril 10 mg oral tablet: 1 tab(s) orally 2 times a day  metoprolol succinate 50 mg oral capsule, extended release: 1 cap(s) orally once a day  montelukast 10 mg oral tablet: 1 tab(s) orally once a day  simvastatin 80 mg oral tablet: 1 tab(s) orally once a day (at bedtime)

## 2024-06-17 LAB
CULTURE RESULTS: SIGNIFICANT CHANGE UP
CULTURE RESULTS: SIGNIFICANT CHANGE UP
SPECIMEN SOURCE: SIGNIFICANT CHANGE UP
SPECIMEN SOURCE: SIGNIFICANT CHANGE UP

## 2024-06-21 DIAGNOSIS — G47.33 OBSTRUCTIVE SLEEP APNEA (ADULT) (PEDIATRIC): ICD-10-CM

## 2024-06-21 DIAGNOSIS — D84.9 IMMUNODEFICIENCY, UNSPECIFIED: ICD-10-CM

## 2024-06-21 DIAGNOSIS — E78.00 PURE HYPERCHOLESTEROLEMIA, UNSPECIFIED: ICD-10-CM

## 2024-06-21 DIAGNOSIS — E87.1 HYPO-OSMOLALITY AND HYPONATREMIA: ICD-10-CM

## 2024-06-21 DIAGNOSIS — Z99.89 DEPENDENCE ON OTHER ENABLING MACHINES AND DEVICES: ICD-10-CM

## 2024-06-21 DIAGNOSIS — J45.909 UNSPECIFIED ASTHMA, UNCOMPLICATED: ICD-10-CM

## 2024-06-21 DIAGNOSIS — I10 ESSENTIAL (PRIMARY) HYPERTENSION: ICD-10-CM

## 2024-06-21 DIAGNOSIS — E89.0 POSTPROCEDURAL HYPOTHYROIDISM: ICD-10-CM

## 2024-06-21 DIAGNOSIS — A41.9 SEPSIS, UNSPECIFIED ORGANISM: ICD-10-CM

## 2024-06-21 DIAGNOSIS — E78.5 HYPERLIPIDEMIA, UNSPECIFIED: ICD-10-CM

## 2024-06-21 DIAGNOSIS — L03.115 CELLULITIS OF RIGHT LOWER LIMB: ICD-10-CM

## 2024-06-21 DIAGNOSIS — Z85.850 PERSONAL HISTORY OF MALIGNANT NEOPLASM OF THYROID: ICD-10-CM

## 2024-06-27 ENCOUNTER — INPATIENT (INPATIENT)
Facility: HOSPITAL | Age: 69
LOS: 1 days | Discharge: HOME | DRG: 603 | End: 2024-06-29
Attending: INTERNAL MEDICINE | Admitting: STUDENT IN AN ORGANIZED HEALTH CARE EDUCATION/TRAINING PROGRAM
Payer: MEDICARE

## 2024-06-27 VITALS
RESPIRATION RATE: 16 BRPM | SYSTOLIC BLOOD PRESSURE: 125 MMHG | HEART RATE: 105 BPM | DIASTOLIC BLOOD PRESSURE: 80 MMHG | HEIGHT: 72 IN | OXYGEN SATURATION: 97 % | TEMPERATURE: 98 F | WEIGHT: 255.96 LBS

## 2024-06-27 DIAGNOSIS — E89.0 POSTPROCEDURAL HYPOTHYROIDISM: Chronic | ICD-10-CM

## 2024-06-27 DIAGNOSIS — L03.90 CELLULITIS, UNSPECIFIED: ICD-10-CM

## 2024-06-27 DIAGNOSIS — Z98.890 OTHER SPECIFIED POSTPROCEDURAL STATES: Chronic | ICD-10-CM

## 2024-06-27 PROBLEM — J45.909 UNSPECIFIED ASTHMA, UNCOMPLICATED: Chronic | Status: ACTIVE | Noted: 2024-06-12

## 2024-06-27 PROBLEM — E78.00 PURE HYPERCHOLESTEROLEMIA, UNSPECIFIED: Chronic | Status: ACTIVE | Noted: 2024-06-12

## 2024-06-27 PROBLEM — I10 ESSENTIAL (PRIMARY) HYPERTENSION: Chronic | Status: ACTIVE | Noted: 2024-06-12

## 2024-06-27 PROBLEM — C73 MALIGNANT NEOPLASM OF THYROID GLAND: Chronic | Status: ACTIVE | Noted: 2024-06-12

## 2024-06-27 LAB
ALBUMIN SERPL ELPH-MCNC: 4 G/DL — SIGNIFICANT CHANGE UP (ref 3.5–5.2)
ALP SERPL-CCNC: 53 U/L — SIGNIFICANT CHANGE UP (ref 30–115)
ALT FLD-CCNC: 19 U/L — SIGNIFICANT CHANGE UP (ref 0–41)
ANION GAP SERPL CALC-SCNC: 9 MMOL/L — SIGNIFICANT CHANGE UP (ref 7–14)
AST SERPL-CCNC: 31 U/L — SIGNIFICANT CHANGE UP (ref 0–41)
BASOPHILS # BLD AUTO: 0.01 K/UL — SIGNIFICANT CHANGE UP (ref 0–0.2)
BASOPHILS NFR BLD AUTO: 0.1 % — SIGNIFICANT CHANGE UP (ref 0–1)
BILIRUB SERPL-MCNC: 0.7 MG/DL — SIGNIFICANT CHANGE UP (ref 0.2–1.2)
BUN SERPL-MCNC: 24 MG/DL — HIGH (ref 10–20)
CALCIUM SERPL-MCNC: 10.1 MG/DL — SIGNIFICANT CHANGE UP (ref 8.4–10.5)
CHLORIDE SERPL-SCNC: 101 MMOL/L — SIGNIFICANT CHANGE UP (ref 98–110)
CO2 SERPL-SCNC: 25 MMOL/L — SIGNIFICANT CHANGE UP (ref 17–32)
CREAT SERPL-MCNC: 1 MG/DL — SIGNIFICANT CHANGE UP (ref 0.7–1.5)
CRP SERPL-MCNC: 48.8 MG/L — HIGH
EGFR: 82 ML/MIN/1.73M2 — SIGNIFICANT CHANGE UP
EGFR: 82 ML/MIN/1.73M2 — SIGNIFICANT CHANGE UP
EOSINOPHIL # BLD AUTO: 0.07 K/UL — SIGNIFICANT CHANGE UP (ref 0–0.7)
EOSINOPHIL NFR BLD AUTO: 0.9 % — SIGNIFICANT CHANGE UP (ref 0–8)
ERYTHROCYTE [SEDIMENTATION RATE] IN BLOOD: 50 MM/HR — HIGH (ref 0–10)
GLUCOSE SERPL-MCNC: 129 MG/DL — HIGH (ref 70–99)
HCT VFR BLD CALC: 37.1 % — LOW (ref 42–52)
HGB BLD-MCNC: 12.7 G/DL — LOW (ref 14–18)
IMM GRANULOCYTES NFR BLD AUTO: 0.4 % — HIGH (ref 0.1–0.3)
LACTATE SERPL-SCNC: 1.4 MMOL/L — SIGNIFICANT CHANGE UP (ref 0.7–2)
LYMPHOCYTES # BLD AUTO: 0.73 K/UL — LOW (ref 1.2–3.4)
LYMPHOCYTES # BLD AUTO: 9.8 % — LOW (ref 20.5–51.1)
MCHC RBC-ENTMCNC: 32.3 PG — HIGH (ref 27–31)
MCHC RBC-ENTMCNC: 34.2 G/DL — SIGNIFICANT CHANGE UP (ref 32–37)
MCV RBC AUTO: 94.4 FL — HIGH (ref 80–94)
MONOCYTES # BLD AUTO: 0.3 K/UL — SIGNIFICANT CHANGE UP (ref 0.1–0.6)
MONOCYTES NFR BLD AUTO: 4 % — SIGNIFICANT CHANGE UP (ref 1.7–9.3)
NEUTROPHILS # BLD AUTO: 6.3 K/UL — SIGNIFICANT CHANGE UP (ref 1.4–6.5)
NEUTROPHILS NFR BLD AUTO: 84.8 % — HIGH (ref 42.2–75.2)
NRBC # BLD: 0 /100 WBCS — SIGNIFICANT CHANGE UP (ref 0–0)
NRBC BLD-RTO: 0 /100 WBCS — SIGNIFICANT CHANGE UP (ref 0–0)
PLATELET # BLD AUTO: 208 K/UL — SIGNIFICANT CHANGE UP (ref 130–400)
PMV BLD: 10 FL — SIGNIFICANT CHANGE UP (ref 7.4–10.4)
POTASSIUM SERPL-MCNC: 4.7 MMOL/L — SIGNIFICANT CHANGE UP (ref 3.5–5)
POTASSIUM SERPL-SCNC: 4.7 MMOL/L — SIGNIFICANT CHANGE UP (ref 3.5–5)
PROT SERPL-MCNC: 7.2 G/DL — SIGNIFICANT CHANGE UP (ref 6–8)
RBC # BLD: 3.93 M/UL — LOW (ref 4.7–6.1)
RBC # FLD: 13.2 % — SIGNIFICANT CHANGE UP (ref 11.5–14.5)
SODIUM SERPL-SCNC: 135 MMOL/L — SIGNIFICANT CHANGE UP (ref 135–146)
WBC # BLD: 7.44 K/UL — SIGNIFICANT CHANGE UP (ref 4.8–10.8)
WBC # FLD AUTO: 7.44 K/UL — SIGNIFICANT CHANGE UP (ref 4.8–10.8)

## 2024-06-27 PROCEDURE — 73590 X-RAY EXAM OF LOWER LEG: CPT | Mod: 26,RT

## 2024-06-27 PROCEDURE — 93970 EXTREMITY STUDY: CPT | Mod: 26

## 2024-06-27 PROCEDURE — 99285 EMERGENCY DEPT VISIT HI MDM: CPT

## 2024-06-27 PROCEDURE — 85025 COMPLETE CBC W/AUTO DIFF WBC: CPT

## 2024-06-27 PROCEDURE — 80048 BASIC METABOLIC PNL TOTAL CA: CPT

## 2024-06-27 PROCEDURE — 93970 EXTREMITY STUDY: CPT

## 2024-06-27 PROCEDURE — 36415 COLL VENOUS BLD VENIPUNCTURE: CPT

## 2024-06-27 PROCEDURE — 99222 1ST HOSP IP/OBS MODERATE 55: CPT | Mod: AI

## 2024-06-27 RX ORDER — MONTELUKAST 4 MG/1
1 TABLET, CHEWABLE ORAL
Refills: 0 | DISCHARGE

## 2024-06-27 RX ORDER — ENOXAPARIN SODIUM 100 MG/ML
40 INJECTION SUBCUTANEOUS EVERY 24 HOURS
Refills: 0 | Status: DISCONTINUED | OUTPATIENT
Start: 2024-06-27 | End: 2024-06-29

## 2024-06-27 RX ORDER — SODIUM CHLORIDE 9 G/1000ML
1000 INJECTION, SOLUTION INTRAVENOUS ONCE
Refills: 0 | Status: COMPLETED | OUTPATIENT
Start: 2024-06-27 | End: 2024-06-27

## 2024-06-27 RX ORDER — MAGNESIUM, ALUMINUM HYDROXIDE 200-200 MG
30 TABLET,CHEWABLE ORAL EVERY 4 HOURS
Refills: 0 | Status: DISCONTINUED | OUTPATIENT
Start: 2024-06-27 | End: 2024-06-29

## 2024-06-27 RX ORDER — MELATONIN 5 MG
3 TABLET ORAL AT BEDTIME
Refills: 0 | Status: DISCONTINUED | OUTPATIENT
Start: 2024-06-27 | End: 2024-06-29

## 2024-06-27 RX ORDER — SIMVASTATIN 20 MG/1
1 TABLET, FILM COATED ORAL
Refills: 0 | DISCHARGE

## 2024-06-27 RX ORDER — EZETIMIBE 10 MG/1
1 TABLET ORAL
Refills: 0 | DISCHARGE

## 2024-06-27 RX ORDER — CLINDAMYCIN PHOSPHATE 150 MG/ML
900 VIAL (ML) INJECTION ONCE
Refills: 0 | Status: COMPLETED | OUTPATIENT
Start: 2024-06-27 | End: 2024-06-27

## 2024-06-27 RX ORDER — HYDROCHLOROTHIAZIDE 25 MG
1 TABLET ORAL
Refills: 0 | DISCHARGE

## 2024-06-27 RX ORDER — ATORVASTATIN CALCIUM 80 MG/1
40 TABLET, FILM COATED ORAL AT BEDTIME
Refills: 0 | Status: DISCONTINUED | OUTPATIENT
Start: 2024-06-27 | End: 2024-06-29

## 2024-06-27 RX ORDER — LISINOPRIL 2.5 MG/1
1 TABLET ORAL
Refills: 0 | DISCHARGE

## 2024-06-27 RX ORDER — LEVOTHYROXINE SODIUM 300 MCG
150 TABLET ORAL
Refills: 0 | Status: DISCONTINUED | OUTPATIENT
Start: 2024-06-27 | End: 2024-06-29

## 2024-06-27 RX ORDER — LISINOPRIL 5 MG/1
10 TABLET ORAL
Refills: 0 | Status: DISCONTINUED | OUTPATIENT
Start: 2024-06-27 | End: 2024-06-29

## 2024-06-27 RX ORDER — ONDANSETRON HCL/PF 4 MG/2 ML
4 VIAL (ML) INJECTION EVERY 8 HOURS
Refills: 0 | Status: DISCONTINUED | OUTPATIENT
Start: 2024-06-27 | End: 2024-06-29

## 2024-06-27 RX ORDER — CEFAZOLIN SODIUM IN 0.9 % NACL 3 G/100 ML
2000 INTRAVENOUS SOLUTION, PIGGYBACK (ML) INTRAVENOUS EVERY 8 HOURS
Refills: 0 | Status: DISCONTINUED | OUTPATIENT
Start: 2024-06-27 | End: 2024-06-27

## 2024-06-27 RX ORDER — CEFAZOLIN SODIUM IN 0.9 % NACL 3 G/100 ML
2000 INTRAVENOUS SOLUTION, PIGGYBACK (ML) INTRAVENOUS EVERY 8 HOURS
Refills: 0 | Status: DISCONTINUED | OUTPATIENT
Start: 2024-06-27 | End: 2024-06-29

## 2024-06-27 RX ORDER — LEVOTHYROXINE SODIUM 125 MCG
1 TABLET ORAL
Refills: 0 | DISCHARGE

## 2024-06-27 RX ORDER — METOPROLOL SUCCINATE 50 MG/1
50 TABLET, EXTENDED RELEASE ORAL DAILY
Refills: 0 | Status: DISCONTINUED | OUTPATIENT
Start: 2024-06-27 | End: 2024-06-29

## 2024-06-27 RX ORDER — ACETAMINOPHEN 500 MG/5ML
650 LIQUID (ML) ORAL EVERY 6 HOURS
Refills: 0 | Status: DISCONTINUED | OUTPATIENT
Start: 2024-06-27 | End: 2024-06-29

## 2024-06-27 RX ORDER — MONTELUKAST SODIUM 10 MG/1
10 TABLET ORAL DAILY
Refills: 0 | Status: DISCONTINUED | OUTPATIENT
Start: 2024-06-27 | End: 2024-06-29

## 2024-06-27 RX ORDER — METOPROLOL TARTRATE 50 MG
1 TABLET ORAL
Refills: 0 | DISCHARGE

## 2024-06-27 RX ADMIN — Medication 100 MILLIGRAM(S): at 14:41

## 2024-06-27 RX ADMIN — ATORVASTATIN CALCIUM 40 MILLIGRAM(S): 80 TABLET, FILM COATED ORAL at 21:20

## 2024-06-27 RX ADMIN — Medication 100 MILLIGRAM(S): at 21:20

## 2024-06-27 RX ADMIN — ENOXAPARIN SODIUM 40 MILLIGRAM(S): 100 INJECTION SUBCUTANEOUS at 14:41

## 2024-06-27 RX ADMIN — SODIUM CHLORIDE 1000 MILLILITER(S): 9 INJECTION, SOLUTION INTRAVENOUS at 12:01

## 2024-06-27 RX ADMIN — LISINOPRIL 10 MILLIGRAM(S): 5 TABLET ORAL at 21:20

## 2024-06-27 RX ADMIN — Medication 100 MILLIGRAM(S): at 09:26

## 2024-06-27 RX ADMIN — SODIUM CHLORIDE 1000 MILLILITER(S): 9 INJECTION, SOLUTION INTRAVENOUS at 09:21

## 2024-06-28 LAB
ANION GAP SERPL CALC-SCNC: 7 MMOL/L — SIGNIFICANT CHANGE UP (ref 7–14)
BASOPHILS # BLD AUTO: 0.02 K/UL — SIGNIFICANT CHANGE UP (ref 0–0.2)
BASOPHILS NFR BLD AUTO: 0.4 % — SIGNIFICANT CHANGE UP (ref 0–1)
BUN SERPL-MCNC: 15 MG/DL — SIGNIFICANT CHANGE UP (ref 10–20)
CALCIUM SERPL-MCNC: 9.5 MG/DL — SIGNIFICANT CHANGE UP (ref 8.4–10.5)
CHLORIDE SERPL-SCNC: 102 MMOL/L — SIGNIFICANT CHANGE UP (ref 98–110)
CO2 SERPL-SCNC: 25 MMOL/L — SIGNIFICANT CHANGE UP (ref 17–32)
CREAT SERPL-MCNC: 0.9 MG/DL — SIGNIFICANT CHANGE UP (ref 0.7–1.5)
EGFR: 93 ML/MIN/1.73M2 — SIGNIFICANT CHANGE UP
EGFR: 93 ML/MIN/1.73M2 — SIGNIFICANT CHANGE UP
EOSINOPHIL # BLD AUTO: 0.11 K/UL — SIGNIFICANT CHANGE UP (ref 0–0.7)
EOSINOPHIL NFR BLD AUTO: 2 % — SIGNIFICANT CHANGE UP (ref 0–8)
GLUCOSE SERPL-MCNC: 122 MG/DL — HIGH (ref 70–99)
HCT VFR BLD CALC: 31.2 % — LOW (ref 42–52)
HGB BLD-MCNC: 10.8 G/DL — LOW (ref 14–18)
IMM GRANULOCYTES NFR BLD AUTO: 0.4 % — HIGH (ref 0.1–0.3)
LYMPHOCYTES # BLD AUTO: 0.66 K/UL — LOW (ref 1.2–3.4)
LYMPHOCYTES # BLD AUTO: 11.8 % — LOW (ref 20.5–51.1)
MCHC RBC-ENTMCNC: 32.7 PG — HIGH (ref 27–31)
MCHC RBC-ENTMCNC: 34.6 G/DL — SIGNIFICANT CHANGE UP (ref 32–37)
MCV RBC AUTO: 94.5 FL — HIGH (ref 80–94)
MONOCYTES # BLD AUTO: 0.42 K/UL — SIGNIFICANT CHANGE UP (ref 0.1–0.6)
MONOCYTES NFR BLD AUTO: 7.5 % — SIGNIFICANT CHANGE UP (ref 1.7–9.3)
NEUTROPHILS # BLD AUTO: 4.35 K/UL — SIGNIFICANT CHANGE UP (ref 1.4–6.5)
NEUTROPHILS NFR BLD AUTO: 77.9 % — HIGH (ref 42.2–75.2)
NRBC # BLD: 0 /100 WBCS — SIGNIFICANT CHANGE UP (ref 0–0)
NRBC BLD-RTO: 0 /100 WBCS — SIGNIFICANT CHANGE UP (ref 0–0)
PLATELET # BLD AUTO: 151 K/UL — SIGNIFICANT CHANGE UP (ref 130–400)
PMV BLD: 9.3 FL — SIGNIFICANT CHANGE UP (ref 7.4–10.4)
POTASSIUM SERPL-MCNC: 4 MMOL/L — SIGNIFICANT CHANGE UP (ref 3.5–5)
POTASSIUM SERPL-SCNC: 4 MMOL/L — SIGNIFICANT CHANGE UP (ref 3.5–5)
RBC # BLD: 3.3 M/UL — LOW (ref 4.7–6.1)
RBC # FLD: 13.2 % — SIGNIFICANT CHANGE UP (ref 11.5–14.5)
SODIUM SERPL-SCNC: 134 MMOL/L — LOW (ref 135–146)
WBC # BLD: 5.58 K/UL — SIGNIFICANT CHANGE UP (ref 4.8–10.8)
WBC # FLD AUTO: 5.58 K/UL — SIGNIFICANT CHANGE UP (ref 4.8–10.8)

## 2024-06-28 PROCEDURE — 99232 SBSQ HOSP IP/OBS MODERATE 35: CPT

## 2024-06-28 RX ORDER — DOXYCYCLINE HYCLATE 100 MG
100 TABLET ORAL EVERY 12 HOURS
Refills: 0 | Status: DISCONTINUED | OUTPATIENT
Start: 2024-06-28 | End: 2024-06-29

## 2024-06-28 RX ADMIN — MONTELUKAST SODIUM 10 MILLIGRAM(S): 10 TABLET ORAL at 14:37

## 2024-06-28 RX ADMIN — Medication 100 MILLIGRAM(S): at 17:35

## 2024-06-28 RX ADMIN — Medication 100 MILLIGRAM(S): at 14:37

## 2024-06-28 RX ADMIN — ENOXAPARIN SODIUM 40 MILLIGRAM(S): 100 INJECTION SUBCUTANEOUS at 14:37

## 2024-06-28 RX ADMIN — Medication 150 MICROGRAM(S): at 05:57

## 2024-06-28 RX ADMIN — METOPROLOL SUCCINATE 50 MILLIGRAM(S): 50 TABLET, EXTENDED RELEASE ORAL at 05:57

## 2024-06-28 RX ADMIN — Medication 100 MILLIGRAM(S): at 05:57

## 2024-06-28 RX ADMIN — Medication 100 MILLIGRAM(S): at 21:11

## 2024-06-28 RX ADMIN — ATORVASTATIN CALCIUM 40 MILLIGRAM(S): 80 TABLET, FILM COATED ORAL at 21:37

## 2024-06-29 ENCOUNTER — TRANSCRIPTION ENCOUNTER (OUTPATIENT)
Age: 69
End: 2024-06-29

## 2024-06-29 VITALS
OXYGEN SATURATION: 97 % | TEMPERATURE: 98 F | HEART RATE: 77 BPM | RESPIRATION RATE: 18 BRPM | DIASTOLIC BLOOD PRESSURE: 79 MMHG | SYSTOLIC BLOOD PRESSURE: 127 MMHG

## 2024-06-29 PROCEDURE — 99239 HOSP IP/OBS DSCHRG MGMT >30: CPT

## 2024-06-29 RX ORDER — CEPHALEXIN 250 MG/1
2 CAPSULE ORAL
Qty: 102 | Refills: 0
Start: 2024-06-29 | End: 2024-07-15

## 2024-06-29 RX ORDER — DOXYCYCLINE HYCLATE 100 MG
1 TABLET ORAL
Qty: 34 | Refills: 0
Start: 2024-06-29 | End: 2024-07-15

## 2024-06-29 RX ADMIN — ENOXAPARIN SODIUM 40 MILLIGRAM(S): 100 INJECTION SUBCUTANEOUS at 13:12

## 2024-06-29 RX ADMIN — Medication 150 MICROGRAM(S): at 05:07

## 2024-06-29 RX ADMIN — LISINOPRIL 10 MILLIGRAM(S): 5 TABLET ORAL at 11:50

## 2024-06-29 RX ADMIN — METOPROLOL SUCCINATE 50 MILLIGRAM(S): 50 TABLET, EXTENDED RELEASE ORAL at 05:07

## 2024-06-29 RX ADMIN — MONTELUKAST SODIUM 10 MILLIGRAM(S): 10 TABLET ORAL at 11:50

## 2024-06-29 RX ADMIN — Medication 100 MILLIGRAM(S): at 05:05

## 2024-06-29 RX ADMIN — Medication 100 MILLIGRAM(S): at 05:52

## 2024-06-29 RX ADMIN — Medication 100 MILLIGRAM(S): at 13:12

## 2024-07-03 DIAGNOSIS — E89.0 POSTPROCEDURAL HYPOTHYROIDISM: ICD-10-CM

## 2024-07-03 DIAGNOSIS — E86.0 DEHYDRATION: ICD-10-CM

## 2024-07-03 DIAGNOSIS — J45.909 UNSPECIFIED ASTHMA, UNCOMPLICATED: ICD-10-CM

## 2024-07-03 DIAGNOSIS — E78.5 HYPERLIPIDEMIA, UNSPECIFIED: ICD-10-CM

## 2024-07-03 DIAGNOSIS — I10 ESSENTIAL (PRIMARY) HYPERTENSION: ICD-10-CM

## 2024-07-03 DIAGNOSIS — Z85.850 PERSONAL HISTORY OF MALIGNANT NEOPLASM OF THYROID: ICD-10-CM

## 2024-07-03 DIAGNOSIS — Z79.890 HORMONE REPLACEMENT THERAPY: ICD-10-CM

## 2024-07-03 DIAGNOSIS — G47.33 OBSTRUCTIVE SLEEP APNEA (ADULT) (PEDIATRIC): ICD-10-CM

## 2024-07-03 DIAGNOSIS — L03.115 CELLULITIS OF RIGHT LOWER LIMB: ICD-10-CM

## 2024-07-11 ENCOUNTER — APPOINTMENT (OUTPATIENT)
Dept: OTOLARYNGOLOGY | Facility: CLINIC | Age: 69
End: 2024-07-11
Payer: MEDICARE

## 2024-07-11 DIAGNOSIS — R49.0 DYSPHONIA: ICD-10-CM

## 2024-07-11 DIAGNOSIS — J38.00 PARALYSIS OF VOCAL CORDS AND LARYNX, UNSPECIFIED: ICD-10-CM

## 2024-07-11 PROCEDURE — 99213 OFFICE O/P EST LOW 20 MIN: CPT | Mod: 25

## 2024-07-11 PROCEDURE — 31575 DIAGNOSTIC LARYNGOSCOPY: CPT

## 2024-07-30 PROBLEM — E03.9 HYPOTHYROIDISM, UNSPECIFIED: Chronic | Status: ACTIVE | Noted: 2024-06-27

## 2024-08-28 ENCOUNTER — APPOINTMENT (OUTPATIENT)
Dept: VASCULAR SURGERY | Facility: CLINIC | Age: 69
End: 2024-08-28
Payer: MEDICARE

## 2024-08-28 VITALS
WEIGHT: 265 LBS | DIASTOLIC BLOOD PRESSURE: 73 MMHG | BODY MASS INDEX: 35.89 KG/M2 | SYSTOLIC BLOOD PRESSURE: 129 MMHG | HEIGHT: 72 IN

## 2024-08-28 DIAGNOSIS — M79.89 OTHER SPECIFIED SOFT TISSUE DISORDERS: ICD-10-CM

## 2024-08-28 PROCEDURE — 99203 OFFICE O/P NEW LOW 30 MIN: CPT

## 2024-08-28 NOTE — REASON FOR VISIT
[Consultation] : a consultation visit [FreeTextEntry1] : For right lower extremity swelling and a history of cellulitis

## 2024-08-28 NOTE — DATA REVIEWED
[FreeTextEntry1] : Venous duplex report reviewed from [facility] from June 30 and June 12 that showed no evidence of DVT bilaterally.

## 2024-08-28 NOTE — HISTORY OF PRESENT ILLNESS
[FreeTextEntry1] : The patient is a 68-year-old male who presents with a recent history of right lower extremity cellulitis.  The patient was admitted 2 times and treated with IV antibiotics.  He is currently on a low-dose oral antibiotics seen by infectious disease.  Today presents with some dependent erythema to the right lower extremity and some mild edema bilaterally.  The patient has been wearing low-grade compression stockings 8 to 10 mmHg compression.

## 2024-08-28 NOTE — ASSESSMENT
[FreeTextEntry1] : The patient is a 68-year-old male who presents with his wife with a history of right lower extremity cellulitis treated with long-term antibiotics.  The patient states his cellulitis is overall improved as well as the swelling from his previous diagnosis back in June.  The patient had 2 venous duplex scans performed as an outpatient in which I reviewed that showed no evidence of DVT.  From my standpoint the patient has some mild edema present bilaterally and venous insufficiency.  I recommend compression stocking therapy at 20 to 30 mmHg compression knee-high to be worn daily.  I have also recommended daily skin emollient to keep his skin healthy and moist and prevent cracking.  No further vascular surgery intervention warranted at this time.  I will see the patient back in my office as needed if his swelling worsens.   I, Dr. Stewart, personally performed the evaluation and management (E/M) services for this established patient who presents today with (a) new problem(s)/exacerbation of (an) existing condition(s). That E/M includes conducting the clinically appropriate interval history &/or exam, assessing all new/exacerbated conditions, and establishing a new plan of care. Today, my PRIYANKA, Fatou Jose Maria was here to observe my evaluation and management service for this new problem/exacerbated condition and follow the plan of care established by me going forward.  Thank you for allowing me to participate in the care of your patient.   Sincerely,   Omar Stewart MD, RPVI, FACS Associate Professor of Surgery , Vascular Fellowship Director of Limb Salvage Surgery [facility] at [facility]

## 2024-09-13 ENCOUNTER — APPOINTMENT (OUTPATIENT)
Dept: CARDIOLOGY | Facility: CLINIC | Age: 69
End: 2024-09-13
Payer: MEDICARE

## 2024-09-13 VITALS
HEART RATE: 63 BPM | BODY MASS INDEX: 35.89 KG/M2 | HEIGHT: 72 IN | DIASTOLIC BLOOD PRESSURE: 80 MMHG | WEIGHT: 265 LBS | OXYGEN SATURATION: 96 % | SYSTOLIC BLOOD PRESSURE: 124 MMHG

## 2024-09-13 DIAGNOSIS — E66.9 OBESITY, UNSPECIFIED: ICD-10-CM

## 2024-09-13 DIAGNOSIS — I45.10 UNSPECIFIED RIGHT BUNDLE-BRANCH BLOCK: ICD-10-CM

## 2024-09-13 DIAGNOSIS — G47.33 OBSTRUCTIVE SLEEP APNEA (ADULT) (PEDIATRIC): ICD-10-CM

## 2024-09-13 DIAGNOSIS — E78.5 HYPERLIPIDEMIA, UNSPECIFIED: ICD-10-CM

## 2024-09-13 DIAGNOSIS — C79.89 SECONDARY MALIGNANT NEOPLASM OF OTHER SPECIFIED SITES: ICD-10-CM

## 2024-09-13 DIAGNOSIS — I25.10 ATHEROSCLEROTIC HEART DISEASE OF NATIVE CORONARY ARTERY W/OUT ANGINA PECTORIS: ICD-10-CM

## 2024-09-13 DIAGNOSIS — I10 ESSENTIAL (PRIMARY) HYPERTENSION: ICD-10-CM

## 2024-09-13 DIAGNOSIS — C80.1 SECONDARY MALIGNANT NEOPLASM OF OTHER SPECIFIED SITES: ICD-10-CM

## 2024-09-13 PROCEDURE — 99213 OFFICE O/P EST LOW 20 MIN: CPT

## 2024-09-13 PROCEDURE — G2211 COMPLEX E/M VISIT ADD ON: CPT

## 2024-09-13 NOTE — HISTORY OF PRESENT ILLNESS
[FreeTextEntry1] : 68 HTN HLD Thyroid cancer. JASE on c-pap. 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 Getting better. He had ct scan chest coronary calcification. Goes gym 3 /wk bicycle 30 mins and treadmill 30 minutes.  and then wts.  He has no chest pain or sob. No palpitations. He needs wt loss. He has abnormal EKG. Cellulitis 9/24.

## 2024-09-13 NOTE — REVIEW OF SYSTEMS
[Joint Pain] : joint pain [Muscle Cramps] : muscle cramps [Knee Pain] : knee pain [Fever] : no fever [Chills] : no chills [Blurry Vision] : no blurred vision [Hearing Loss] : no hearing loss [Dyspnea on exertion] : not dyspnea during exertion [Wheezing] : no wheezing [Abdominal Pain] : no abdominal pain [Nocturia] : no nocturia [Rash] : no rash [Dizziness] : no dizziness [Confusion] : no confusion was observed [Easy Bleeding] : no tendency for easy bleeding

## 2024-09-13 NOTE — ASSESSMENT
[FreeTextEntry1] : 68 HTN HLD Thyroid cancer. JASE on c-pap. 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 Getting better. He had ct scan chest coronary calcification. Goes gym 3 /wk bicycle 30 mins and then wts.  He has no chest pain or sob. No palpitations. He needs ldl to be about 70 or less .LDL 6/24 74.     Because abnormal ekg and calcium heart. SE 10/23 neg poor exercise tolerance mild mr.  He needs weight loss. He has M spike. Followed by oncology. Bloods outside reviewed. LFT slightly up . PMD to repeat

## 2024-10-07 ENCOUNTER — RX RENEWAL (OUTPATIENT)
Age: 69
End: 2024-10-07

## 2025-01-03 ENCOUNTER — RX RENEWAL (OUTPATIENT)
Age: 70
End: 2025-01-03

## 2025-02-07 ENCOUNTER — APPOINTMENT (OUTPATIENT)
Dept: PULMONOLOGY | Facility: CLINIC | Age: 70
End: 2025-02-07
Payer: MEDICARE

## 2025-02-07 VITALS
BODY MASS INDEX: 35.89 KG/M2 | HEART RATE: 84 BPM | DIASTOLIC BLOOD PRESSURE: 82 MMHG | SYSTOLIC BLOOD PRESSURE: 120 MMHG | WEIGHT: 265 LBS | OXYGEN SATURATION: 96 % | HEIGHT: 72 IN

## 2025-02-07 DIAGNOSIS — E66.812 OBESITY, CLASS 2: ICD-10-CM

## 2025-02-07 DIAGNOSIS — G47.33 OBSTRUCTIVE SLEEP APNEA (ADULT) (PEDIATRIC): ICD-10-CM

## 2025-02-07 DIAGNOSIS — J45.909 UNSPECIFIED ASTHMA, UNCOMPLICATED: ICD-10-CM

## 2025-02-07 DIAGNOSIS — C73 MALIGNANT NEOPLASM OF THYROID GLAND: ICD-10-CM

## 2025-02-07 DIAGNOSIS — R91.1 SOLITARY PULMONARY NODULE: ICD-10-CM

## 2025-02-07 DIAGNOSIS — E66.9 OBESITY, UNSPECIFIED: ICD-10-CM

## 2025-02-07 PROCEDURE — 99214 OFFICE O/P EST MOD 30 MIN: CPT

## 2025-02-07 PROCEDURE — G2211 COMPLEX E/M VISIT ADD ON: CPT

## 2025-02-27 ENCOUNTER — RX RENEWAL (OUTPATIENT)
Age: 70
End: 2025-02-27

## 2025-03-07 ENCOUNTER — APPOINTMENT (OUTPATIENT)
Dept: CARDIOLOGY | Facility: CLINIC | Age: 70
End: 2025-03-07
Payer: MEDICARE

## 2025-03-07 VITALS
BODY MASS INDEX: 35.89 KG/M2 | WEIGHT: 265 LBS | HEIGHT: 72 IN | HEART RATE: 59 BPM | OXYGEN SATURATION: 97 % | SYSTOLIC BLOOD PRESSURE: 128 MMHG | DIASTOLIC BLOOD PRESSURE: 80 MMHG

## 2025-03-07 DIAGNOSIS — G47.33 OBSTRUCTIVE SLEEP APNEA (ADULT) (PEDIATRIC): ICD-10-CM

## 2025-03-07 DIAGNOSIS — E78.5 HYPERLIPIDEMIA, UNSPECIFIED: ICD-10-CM

## 2025-03-07 DIAGNOSIS — I45.10 UNSPECIFIED RIGHT BUNDLE-BRANCH BLOCK: ICD-10-CM

## 2025-03-07 DIAGNOSIS — I25.10 ATHEROSCLEROTIC HEART DISEASE OF NATIVE CORONARY ARTERY W/OUT ANGINA PECTORIS: ICD-10-CM

## 2025-03-07 DIAGNOSIS — C73 MALIGNANT NEOPLASM OF THYROID GLAND: ICD-10-CM

## 2025-03-07 PROCEDURE — G2211 COMPLEX E/M VISIT ADD ON: CPT

## 2025-03-07 PROCEDURE — 93000 ELECTROCARDIOGRAM COMPLETE: CPT

## 2025-03-07 PROCEDURE — 99213 OFFICE O/P EST LOW 20 MIN: CPT

## 2025-03-24 RX ORDER — MONTELUKAST 10 MG/1
10 TABLET, FILM COATED ORAL DAILY
Qty: 1 | Refills: 5 | Status: ACTIVE | COMMUNITY
Start: 2025-03-24 | End: 1900-01-01

## 2025-04-04 ENCOUNTER — RX RENEWAL (OUTPATIENT)
Age: 70
End: 2025-04-04

## 2025-05-06 ENCOUNTER — APPOINTMENT (OUTPATIENT)
Dept: OTOLARYNGOLOGY | Facility: CLINIC | Age: 70
End: 2025-05-06
Payer: MEDICARE

## 2025-05-06 VITALS — BODY MASS INDEX: 35.89 KG/M2 | WEIGHT: 265 LBS | HEIGHT: 72 IN

## 2025-05-06 DIAGNOSIS — J38.01 PARALYSIS OF VOCAL CORDS AND LARYNX, UNILATERAL: ICD-10-CM

## 2025-05-06 DIAGNOSIS — B37.89 OTHER SITES OF CANDIDIASIS: ICD-10-CM

## 2025-05-06 DIAGNOSIS — R49.0 DYSPHONIA: ICD-10-CM

## 2025-05-06 PROCEDURE — 99214 OFFICE O/P EST MOD 30 MIN: CPT | Mod: 25

## 2025-05-06 PROCEDURE — 31575 DIAGNOSTIC LARYNGOSCOPY: CPT

## 2025-05-06 RX ORDER — NYSTATIN 100000 [USP'U]/ML
100000 SUSPENSION ORAL 3 TIMES DAILY
Qty: 630 | Refills: 0 | Status: ACTIVE | COMMUNITY
Start: 2025-05-06 | End: 1900-01-01

## 2025-05-06 RX ORDER — ALPRAZOLAM 0.5 MG/1
0.5 TABLET ORAL 3 TIMES DAILY
Qty: 6 | Refills: 0 | Status: ACTIVE | COMMUNITY
Start: 2025-05-06 | End: 1900-01-01

## 2025-05-06 RX ORDER — PREDNISONE 20 MG/1
20 TABLET ORAL
Qty: 14 | Refills: 0 | Status: ACTIVE | COMMUNITY
Start: 2025-05-06 | End: 1900-01-01

## 2025-06-04 ENCOUNTER — APPOINTMENT (OUTPATIENT)
Dept: OTOLARYNGOLOGY | Facility: CLINIC | Age: 70
End: 2025-06-04
Payer: MEDICARE

## 2025-06-04 DIAGNOSIS — B37.89 OTHER SITES OF CANDIDIASIS: ICD-10-CM

## 2025-06-04 DIAGNOSIS — J38.00 PARALYSIS OF VOCAL CORDS AND LARYNX, UNSPECIFIED: ICD-10-CM

## 2025-06-04 DIAGNOSIS — R49.0 DYSPHONIA: ICD-10-CM

## 2025-06-04 PROCEDURE — 31575 DIAGNOSTIC LARYNGOSCOPY: CPT

## 2025-06-04 PROCEDURE — 99213 OFFICE O/P EST LOW 20 MIN: CPT | Mod: 25

## 2025-06-20 ENCOUNTER — NON-APPOINTMENT (OUTPATIENT)
Age: 70
End: 2025-06-20

## 2025-06-20 ENCOUNTER — APPOINTMENT (OUTPATIENT)
Dept: OTOLARYNGOLOGY | Facility: CLINIC | Age: 70
End: 2025-06-20
Payer: MEDICARE

## 2025-06-20 VITALS
HEIGHT: 72 IN | BODY MASS INDEX: 35.89 KG/M2 | HEART RATE: 65 BPM | DIASTOLIC BLOOD PRESSURE: 80 MMHG | OXYGEN SATURATION: 97 % | WEIGHT: 265 LBS | SYSTOLIC BLOOD PRESSURE: 138 MMHG | TEMPERATURE: 97.4 F

## 2025-06-20 PROBLEM — R49.0 MUSCLE TENSION DYSPHONIA: Status: ACTIVE | Noted: 2025-06-20

## 2025-06-20 PROCEDURE — 70371 SPEECH EVALUATION COMPLEX: CPT | Mod: 59

## 2025-06-20 PROCEDURE — 99215 OFFICE O/P EST HI 40 MIN: CPT | Mod: 25

## 2025-06-20 PROCEDURE — 31579 LARYNGOSCOPY TELESCOPIC: CPT

## 2025-06-24 ENCOUNTER — NON-APPOINTMENT (OUTPATIENT)
Age: 70
End: 2025-06-24

## 2025-08-04 ENCOUNTER — APPOINTMENT (OUTPATIENT)
Dept: PULMONOLOGY | Facility: CLINIC | Age: 70
End: 2025-08-04
Payer: MEDICARE

## 2025-08-04 VITALS
HEIGHT: 72 IN | DIASTOLIC BLOOD PRESSURE: 86 MMHG | HEART RATE: 45 BPM | BODY MASS INDEX: 35.89 KG/M2 | WEIGHT: 265 LBS | SYSTOLIC BLOOD PRESSURE: 125 MMHG

## 2025-08-04 DIAGNOSIS — G47.33 OBSTRUCTIVE SLEEP APNEA (ADULT) (PEDIATRIC): ICD-10-CM

## 2025-08-04 DIAGNOSIS — J45.909 UNSPECIFIED ASTHMA, UNCOMPLICATED: ICD-10-CM

## 2025-08-04 DIAGNOSIS — E66.9 OBESITY, UNSPECIFIED: ICD-10-CM

## 2025-08-04 PROCEDURE — 99214 OFFICE O/P EST MOD 30 MIN: CPT

## 2025-08-04 PROCEDURE — G2211 COMPLEX E/M VISIT ADD ON: CPT

## 2025-08-05 ENCOUNTER — APPOINTMENT (OUTPATIENT)
Dept: OTOLARYNGOLOGY | Facility: CLINIC | Age: 70
End: 2025-08-05
Payer: MEDICARE

## 2025-08-05 PROCEDURE — 92507 TX SP LANG VOICE COMM INDIV: CPT | Mod: GN,59

## 2025-08-05 PROCEDURE — 92524 BEHAVRAL QUALIT ANALYS VOICE: CPT | Mod: GN,59

## 2025-08-12 ENCOUNTER — APPOINTMENT (OUTPATIENT)
Dept: OTOLARYNGOLOGY | Facility: CLINIC | Age: 70
End: 2025-08-12
Payer: MEDICARE

## 2025-08-12 PROCEDURE — 92507 TX SP LANG VOICE COMM INDIV: CPT | Mod: GN

## 2025-08-15 ENCOUNTER — APPOINTMENT (OUTPATIENT)
Dept: OTOLARYNGOLOGY | Facility: CLINIC | Age: 70
End: 2025-08-15
Payer: MEDICARE

## 2025-08-15 VITALS
DIASTOLIC BLOOD PRESSURE: 77 MMHG | BODY MASS INDEX: 35.89 KG/M2 | TEMPERATURE: 97.8 F | HEIGHT: 72 IN | WEIGHT: 265 LBS | SYSTOLIC BLOOD PRESSURE: 123 MMHG | HEART RATE: 63 BPM

## 2025-08-15 DIAGNOSIS — J38.00 PARALYSIS OF VOCAL CORDS AND LARYNX, UNSPECIFIED: ICD-10-CM

## 2025-08-15 DIAGNOSIS — C80.1 SECONDARY MALIGNANT NEOPLASM OF OTHER SPECIFIED SITES: ICD-10-CM

## 2025-08-15 DIAGNOSIS — R91.1 SOLITARY PULMONARY NODULE: ICD-10-CM

## 2025-08-15 DIAGNOSIS — R49.0 DYSPHONIA: ICD-10-CM

## 2025-08-15 DIAGNOSIS — C79.89 SECONDARY MALIGNANT NEOPLASM OF OTHER SPECIFIED SITES: ICD-10-CM

## 2025-08-15 DIAGNOSIS — R13.10 DYSPHAGIA, UNSPECIFIED: ICD-10-CM

## 2025-08-15 PROCEDURE — 31579 LARYNGOSCOPY TELESCOPIC: CPT

## 2025-08-15 PROCEDURE — 99215 OFFICE O/P EST HI 40 MIN: CPT | Mod: 25

## 2025-08-18 ENCOUNTER — APPOINTMENT (OUTPATIENT)
Dept: OTOLARYNGOLOGY | Facility: CLINIC | Age: 70
End: 2025-08-18
Payer: MEDICARE

## 2025-08-18 PROCEDURE — 92507 TX SP LANG VOICE COMM INDIV: CPT | Mod: GN

## 2025-08-26 ENCOUNTER — APPOINTMENT (OUTPATIENT)
Dept: OTOLARYNGOLOGY | Facility: CLINIC | Age: 70
End: 2025-08-26
Payer: MEDICARE

## 2025-08-26 PROCEDURE — 92507 TX SP LANG VOICE COMM INDIV: CPT | Mod: GN

## 2025-09-05 ENCOUNTER — APPOINTMENT (OUTPATIENT)
Dept: OTOLARYNGOLOGY | Facility: CLINIC | Age: 70
End: 2025-09-05

## 2025-09-12 ENCOUNTER — APPOINTMENT (OUTPATIENT)
Dept: OTOLARYNGOLOGY | Facility: CLINIC | Age: 70
End: 2025-09-12
Payer: MEDICARE

## 2025-09-12 PROCEDURE — 92507 TX SP LANG VOICE COMM INDIV: CPT | Mod: GN

## 2025-09-17 ENCOUNTER — APPOINTMENT (OUTPATIENT)
Dept: OTOLARYNGOLOGY | Facility: CLINIC | Age: 70
End: 2025-09-17
Payer: MEDICARE

## 2025-09-17 PROCEDURE — 92507 TX SP LANG VOICE COMM INDIV: CPT | Mod: GN

## 2025-09-19 ENCOUNTER — APPOINTMENT (OUTPATIENT)
Dept: CARDIOLOGY | Facility: CLINIC | Age: 70
End: 2025-09-19